# Patient Record
Sex: FEMALE | Race: WHITE | Employment: PART TIME | URBAN - METROPOLITAN AREA
[De-identification: names, ages, dates, MRNs, and addresses within clinical notes are randomized per-mention and may not be internally consistent; named-entity substitution may affect disease eponyms.]

---

## 2020-01-22 ENCOUNTER — HOSPITAL ENCOUNTER (OUTPATIENT)
Age: 50
Setting detail: SPECIMEN
Discharge: HOME | End: 2020-01-22
Payer: COMMERCIAL

## 2020-01-22 LAB
ABSOLUTE EOS #: 0.1 K/UL (ref 0–0.44)
ABSOLUTE IMMATURE GRANULOCYTE: <0.03 K/UL (ref 0–0.3)
ABSOLUTE LYMPH #: 2.35 K/UL (ref 1.1–3.7)
ABSOLUTE MONO #: 0.45 K/UL (ref 0.1–1.2)
ALBUMIN SERPL-MCNC: 4.2 G/DL (ref 3.5–5.2)
ALBUMIN/GLOBULIN RATIO: 1.4 (ref 1–2.5)
ALP BLD-CCNC: 79 U/L (ref 35–104)
ALT SERPL-CCNC: 13 U/L (ref 5–33)
ANION GAP SERPL CALCULATED.3IONS-SCNC: 14 MMOL/L (ref 9–17)
AST SERPL-CCNC: 13 U/L
BASOPHILS # BLD: 1 % (ref 0–2)
BASOPHILS ABSOLUTE: 0.05 K/UL (ref 0–0.2)
BILIRUB SERPL-MCNC: 0.34 MG/DL (ref 0.3–1.2)
BUN BLDV-MCNC: 18 MG/DL (ref 6–20)
BUN/CREAT BLD: NORMAL (ref 9–20)
CALCIUM SERPL-MCNC: 9.5 MG/DL (ref 8.6–10.4)
CHLORIDE BLD-SCNC: 105 MMOL/L (ref 98–107)
CHOLESTEROL/HDL RATIO: 4.3
CHOLESTEROL: 251 MG/DL
CO2: 23 MMOL/L (ref 20–31)
CREAT SERPL-MCNC: 0.74 MG/DL (ref 0.5–0.9)
DIFFERENTIAL TYPE: NORMAL
EOSINOPHILS RELATIVE PERCENT: 2 % (ref 1–4)
FERRITIN: 71 UG/L (ref 13–150)
GFR AFRICAN AMERICAN: >60 ML/MIN
GFR NON-AFRICAN AMERICAN: >60 ML/MIN
GFR SERPL CREATININE-BSD FRML MDRD: NORMAL ML/MIN/{1.73_M2}
GFR SERPL CREATININE-BSD FRML MDRD: NORMAL ML/MIN/{1.73_M2}
GLUCOSE BLD-MCNC: 94 MG/DL (ref 70–99)
HCT VFR BLD CALC: 42 % (ref 36.3–47.1)
HDLC SERPL-MCNC: 59 MG/DL
HEMOGLOBIN: 14 G/DL (ref 11.9–15.1)
IMMATURE GRANULOCYTES: 0 %
IRON SATURATION: 28 % (ref 20–55)
IRON: 100 UG/DL (ref 37–145)
LDL CHOLESTEROL: 172 MG/DL (ref 0–130)
LYMPHOCYTES # BLD: 42 % (ref 24–43)
MCH RBC QN AUTO: 32.5 PG (ref 25.2–33.5)
MCHC RBC AUTO-ENTMCNC: 33.3 G/DL (ref 28.4–34.8)
MCV RBC AUTO: 97.4 FL (ref 82.6–102.9)
MONOCYTES # BLD: 8 % (ref 3–12)
NRBC AUTOMATED: 0 PER 100 WBC
PDW BLD-RTO: 12.6 % (ref 11.8–14.4)
PLATELET # BLD: 252 K/UL (ref 138–453)
PLATELET ESTIMATE: NORMAL
PMV BLD AUTO: 9.8 FL (ref 8.1–13.5)
POTASSIUM SERPL-SCNC: 4.9 MMOL/L (ref 3.7–5.3)
RBC # BLD: 4.31 M/UL (ref 3.95–5.11)
RBC # BLD: NORMAL 10*6/UL
RHEUMATOID FACTOR: <10 IU/ML
SEG NEUTROPHILS: 47 % (ref 36–65)
SEGMENTED NEUTROPHILS ABSOLUTE COUNT: 2.6 K/UL (ref 1.5–8.1)
SODIUM BLD-SCNC: 142 MMOL/L (ref 135–144)
TOTAL IRON BINDING CAPACITY: 355 UG/DL (ref 250–450)
TOTAL PROTEIN: 7.2 G/DL (ref 6.4–8.3)
TRIGL SERPL-MCNC: 100 MG/DL
TSH SERPL DL<=0.05 MIU/L-ACNC: 0.92 MIU/L (ref 0.3–5)
UNSATURATED IRON BINDING CAPACITY: 255 UG/DL (ref 112–347)
VITAMIN D 25-HYDROXY: 29.9 NG/ML (ref 30–100)
VLDLC SERPL CALC-MCNC: ABNORMAL MG/DL (ref 1–30)
WBC # BLD: 5.6 K/UL (ref 3.5–11.3)
WBC # BLD: NORMAL 10*3/UL

## 2020-01-23 LAB
ANTI-NUCLEAR ANTIBODY (ANA): NEGATIVE
SEDIMENTATION RATE, ERYTHROCYTE: 15 MM (ref 0–20)

## 2020-07-30 ENCOUNTER — HOSPITAL ENCOUNTER (OUTPATIENT)
Dept: GENERAL RADIOLOGY | Age: 50
Discharge: HOME | End: 2020-08-01
Payer: COMMERCIAL

## 2020-07-30 ENCOUNTER — OFFICE VISIT (OUTPATIENT)
Dept: NEUROLOGY | Age: 50
End: 2020-07-30
Payer: COMMERCIAL

## 2020-07-30 ENCOUNTER — HOSPITAL ENCOUNTER (OUTPATIENT)
Dept: LAB | Age: 50
Discharge: HOME | End: 2020-07-30
Payer: COMMERCIAL

## 2020-07-30 VITALS
BODY MASS INDEX: 32.37 KG/M2 | SYSTOLIC BLOOD PRESSURE: 154 MMHG | HEART RATE: 92 BPM | DIASTOLIC BLOOD PRESSURE: 88 MMHG | OXYGEN SATURATION: 96 % | TEMPERATURE: 97.9 F | HEIGHT: 66 IN | WEIGHT: 201.4 LBS

## 2020-07-30 PROBLEM — F32.A ANXIETY AND DEPRESSION: Status: ACTIVE | Noted: 2020-07-30

## 2020-07-30 PROBLEM — M54.2 CHRONIC NECK AND BACK PAIN: Status: ACTIVE | Noted: 2020-07-30

## 2020-07-30 PROBLEM — R41.0 CONFUSION: Status: ACTIVE | Noted: 2020-07-30

## 2020-07-30 PROBLEM — R42 DIZZINESS: Status: ACTIVE | Noted: 2020-07-30

## 2020-07-30 PROBLEM — F17.200 SMOKER: Status: ACTIVE | Noted: 2020-07-30

## 2020-07-30 PROBLEM — Z91.81 H/O FALLING: Status: ACTIVE | Noted: 2020-07-30

## 2020-07-30 PROBLEM — F41.9 ANXIETY AND DEPRESSION: Status: ACTIVE | Noted: 2020-07-30

## 2020-07-30 PROBLEM — R29.898 WEAKNESS OF BOTH HANDS: Status: ACTIVE | Noted: 2020-07-30

## 2020-07-30 PROBLEM — R68.89 FORGETFULNESS: Status: ACTIVE | Noted: 2020-07-30

## 2020-07-30 PROBLEM — G62.9 PERIPHERAL POLYNEUROPATHY: Status: ACTIVE | Noted: 2020-07-30

## 2020-07-30 PROBLEM — M19.90 OSTEOARTHRITIS: Status: ACTIVE | Noted: 2020-07-30

## 2020-07-30 PROBLEM — R41.3 MEMORY PROBLEM: Status: ACTIVE | Noted: 2020-07-30

## 2020-07-30 PROBLEM — M54.9 CHRONIC NECK AND BACK PAIN: Status: ACTIVE | Noted: 2020-07-30

## 2020-07-30 PROBLEM — Z81.8 FAMILY HISTORY OF DEMENTIA: Status: ACTIVE | Noted: 2020-07-30

## 2020-07-30 PROBLEM — G89.29 CHRONIC NECK AND BACK PAIN: Status: ACTIVE | Noted: 2020-07-30

## 2020-07-30 PROBLEM — Z87.828 OLD HEAD INJURY: Status: ACTIVE | Noted: 2020-07-30

## 2020-07-30 PROBLEM — R41.9 COGNITIVE COMPLAINTS: Status: ACTIVE | Noted: 2020-07-30

## 2020-07-30 PROBLEM — R26.89 BALANCE PROBLEM: Status: ACTIVE | Noted: 2020-07-30

## 2020-07-30 PROBLEM — R29.898 WEAKNESS OF BOTH LEGS: Status: ACTIVE | Noted: 2020-07-30

## 2020-07-30 LAB
RHEUMATOID FACTOR: <10 IU/ML
TSH SERPL DL<=0.05 MIU/L-ACNC: 1.08 MIU/L (ref 0.3–5)

## 2020-07-30 PROCEDURE — 82607 VITAMIN B-12: CPT

## 2020-07-30 PROCEDURE — 86431 RHEUMATOID FACTOR QUANT: CPT

## 2020-07-30 PROCEDURE — 84443 ASSAY THYROID STIM HORMONE: CPT

## 2020-07-30 PROCEDURE — G8427 DOCREV CUR MEDS BY ELIG CLIN: HCPCS | Performed by: PSYCHIATRY & NEUROLOGY

## 2020-07-30 PROCEDURE — 85613 RUSSELL VIPER VENOM DILUTED: CPT

## 2020-07-30 PROCEDURE — 36415 COLL VENOUS BLD VENIPUNCTURE: CPT

## 2020-07-30 PROCEDURE — 72040 X-RAY EXAM NECK SPINE 2-3 VW: CPT

## 2020-07-30 PROCEDURE — G8926 SPIRO NO PERF OR DOC: HCPCS | Performed by: PSYCHIATRY & NEUROLOGY

## 2020-07-30 PROCEDURE — G8417 CALC BMI ABV UP PARAM F/U: HCPCS | Performed by: PSYCHIATRY & NEUROLOGY

## 2020-07-30 PROCEDURE — 86038 ANTINUCLEAR ANTIBODIES: CPT

## 2020-07-30 PROCEDURE — 99214 OFFICE O/P EST MOD 30 MIN: CPT

## 2020-07-30 PROCEDURE — 85610 PROTHROMBIN TIME: CPT

## 2020-07-30 PROCEDURE — 72100 X-RAY EXAM L-S SPINE 2/3 VWS: CPT

## 2020-07-30 PROCEDURE — 99245 OFF/OP CONSLTJ NEW/EST HI 55: CPT | Performed by: PSYCHIATRY & NEUROLOGY

## 2020-07-30 PROCEDURE — 85730 THROMBOPLASTIN TIME PARTIAL: CPT

## 2020-07-30 PROCEDURE — 71046 X-RAY EXAM CHEST 2 VIEWS: CPT

## 2020-07-30 PROCEDURE — 3023F SPIROM DOC REV: CPT | Performed by: PSYCHIATRY & NEUROLOGY

## 2020-07-30 PROCEDURE — 86147 CARDIOLIPIN ANTIBODY EA IG: CPT

## 2020-07-30 PROCEDURE — 82746 ASSAY OF FOLIC ACID SERUM: CPT

## 2020-07-30 ASSESSMENT — ENCOUNTER SYMPTOMS
APNEA: 0
CONSTIPATION: 0
FACIAL SWELLING: 0
DIARRHEA: 0
SHORTNESS OF BREATH: 0
BLOOD IN STOOL: 0
EYE REDNESS: 0
ABDOMINAL PAIN: 0
EYE DISCHARGE: 0
CHEST TIGHTNESS: 0
EYE ITCHING: 0
TROUBLE SWALLOWING: 0
VISUAL CHANGE: 0
NAUSEA: 0
BACK PAIN: 1
VOICE CHANGE: 0
PHOTOPHOBIA: 0
WHEEZING: 0
COUGH: 0
CHOKING: 0
SORE THROAT: 0
BOWEL INCONTINENCE: 0
COLOR CHANGE: 0
SINUS PRESSURE: 0
VOMITING: 0
ABDOMINAL DISTENTION: 0
EYE PAIN: 0

## 2020-07-30 NOTE — PATIENT INSTRUCTIONS
[facility] NEUROLOGY    Due to the complex nature of our neurological testing, It is the policy of the Neurology Department not to release the results of your testing over the phone. Once all testing is completed and we have all the results back, Dr. Brinda Caraballo will then personally go over all the results with you and answer any questions that you may have during a follow up appointment. If you are unable to keep this appointment, please notify the [address] @ [phone], as soon as possible. Please bring your prescription bottles to all appointments. * FALL   PRECAUTIONS. *   ADEQUATE   FLUID  INTAKE   AND  ELECTROLYTE  BALANCE           * KEEP  DAIRY  OF   THE  NEUROLOGICAL  SYMPTOMS          *  TO  MAINTAIN  REGULAR  SLEEP  WAKE  CYCLES. *   TO  HAVE  ADEQUATE  REST  AND   SLEEP    HOURS.        *    AVOID  USAGE OF   TOBACCO,  EXCESSIVE  ALCOHOL                AND   ILLEGAL   SUBSTANCES,  IF  ANY          *  Maintain   Healthy  Life Style    With   Periodic  Monitoring  Of      Any  Medical  Conditions  Including   Elevated  Blood  Pressure,  Lipid  Profile,     Blood  Sugar levels  And   Heart  Disease. *   Period   Screening  For  Cancers  Involving  Breast,  Colon,   lungs  And  Other  Organs  As  Applicable,  In consultation   With  Your  Primary Care Providers. *  If   There is  Any  Significant  Worsening   Of  Current  Symptoms  And  Or  If    Any additional  New  Neurological  Symptoms                 Or  Significant  Concerns   Should  Call  911 or  Go  To  Emergency  Department  For  Further  Immediate  Evaluation.

## 2020-07-30 NOTE — PROGRESS NOTES
[facility]  Neurology  [address] [address]  TATNS:[phone]   Fax: [phone]    SUBJECTIVE:     PATIENT ID:  Adelfo Odom is a  RIGHT    HANDED 47 y.o. female. Neurologic Problem   The patient's primary symptoms include clumsiness, focal sensory loss, focal weakness, a loss of balance, memory loss and weakness. The patient's pertinent negatives include no altered mental status, near-syncope, slurred speech, syncope or visual change. This is a chronic problem. Episode onset: WORSE  SINCE      2018   The neurological problem developed insidiously. The problem has been waxing and waning since onset. There was right-sided, upper extremity, lower extremity and left-sided focality noted. Associated symptoms include back pain, confusion, dizziness and light-headedness. Pertinent negatives include no abdominal pain, auditory change, aura, bladder incontinence, bowel incontinence, chest pain, diaphoresis, fatigue, fever, headaches, nausea, neck pain, palpitations, shortness of breath, vertigo or vomiting. Past treatments include nothing. The treatment provided no relief. Her past medical history is significant for dementia, head trauma and mood changes. There is no history of a bleeding disorder, a clotting disorder, a CVA, liver disease or seizures. History obtained from  The patient     and other  available   medical  records   were  Also  reviewed. The  Duration,  Quality,  Severity,  Location,  Timing,  Context,  Modifying  Factors   Of   The   Chief   Complaint   And  Present  Illness       Was   Reviewed   In   Chronological   Manner.                                             PATIENT'S  MAIN  CONCERNS INCLUDE :                     1)       H/O   PROGRESSIVE  MEMORY  PROBLEMS,                                  FORGETFULNESS,       WORD  FINDING     AND                                        BRIEF   CONFUSION        FOR MORE  THAN    ONE     YEAR                              2)      FAMILY     H/O     DEMENTIA                                               -   MOTHER                          3)      H/O   CHRONIC   ANXIETY,   DEPRESSION                                                 FOR     MORE  THAN   30    YEARS                            -    ON    WELLBUTRIN                                 TO  FOLLOW  WITH  MENTAL  HEALTH  PROFESSIONALS                                4)      MULTIPLE  CO  MORBID  MEDICAL  CONDITIONS                                     -   BEING  FOLLOWED  BY     HER  PCP                              5)                   H/O     CHRONIC  SMOKING                    PATIENT  AWARE  OF  RISKS  AND                 SIDE  EFFECTS  OF   SMOKING   DISCUSSED. PATIENT   ADVISED   AND  COUNSELED    TO   QUIT  SMOKING.                        6)     H/O   CHRONIC    ARTHRITIS                             -   ON    NEURONTIN,    FLEXERIL ,     MOBIC                       7)      PREVIOUS     H/O    CONCUSSIONS                                           DUE  TO    FALLING                             8)        PREVIOUS      H/O   ALCOHOL    ABUSE                      9)         H/O   CHRONIC   INTERMITTENT   DIZZINESS                              HAD  CARDIOLOGY   EVALUATIONS    IN     THE PAST                     10)      H/O   RECURRENT  FALLING                      11)      H/O   MILD    BALANCE PROBLEMS                                         FOR  MORE  THAN  ONE    YEAR                           12)     H/O   CHRONIC  NECK   AND  BACK PAIN                                 PREVIOUS     H/O    NECK   AND  LUMBAR    SURGERIES                               13)     PERIPHERAL  POLYNEUROPATHY     IN                              GLOVE  AND  STOCKING  MANNER                                     14)       IN  VIEW  OF  THE  PATIENT'S    MULTIPLE   NEUROLOGICAL                           SYMPTOMS  AND CONCERNS    FOR  PROLONGED   DURATION,                           AND    MULTIPLE   CO MORBID  MEDICAL  CONDITIONS,                           PATIENT    NEEDS  NEURO  DIAGNOSTIC  EVALUATIONS                      FOR   ANY   NEUROLOGICAL  PATHOLOGIES    AND  OTHER                        CORRECTABLE   ETIOLOGIES;     AND                              PATIENT  REQUESTS   THE  SAME. 15)      VARIOUS  RISK   FACTORS   WERE  REVIEWED   AND   DISCUSSED. PATIENT   HAS  MULTIPLE   MEDICAL, NEUROLOGICAL                                AND   MENTAL HEALTH   PROBLEMS                                     PATIENT'S   MANAGEMENT  IS  CHALLENGING.                                                             PRECIPITATING  FACTORS: including  fever/infection, exertion/relaxation, position change, stress, weather change,                        medications/alcohol, time of day/darkness/light  Are  absent                                                              MODIFYING  FACTORS:  fever/infection, exertion/relaxation, position change, stress, weather change,                        medications/alcohol, time of day/darkness/light  Are  absent             Patient   Indicates   The  Presence   And  The  Absence  Of  The  Following    Associated  And   Additional  Neurological    Symptoms:                                Balance  And coordination   problems  present           Gait problems     present            Headaches      absent              Migraines           absent           Memory problemspresent              Confusion        present            Paresthesia   numbness          absent           Seizures  And  Starring  Episodes           absent           Syncope,  Near  syncopal episodes         absent           Speech   problems           absent             Swallowing   Problems      absent            Dizziness,  Light headedness           present              Vertigo absent             Generalized   Weakness    present              focal  Weakness     absent             Tremors         absent              Sleep  Problems     present             History  Of   Recent  Head  Injury     absent             History  Of   Recent  TIA     absent             History  Of   Recent    Stroke     absent             Neck  Pain   and   Neck muscle  Spasms  present               Radiating  down   And   Weakness           absent            Lower back   Pain  And     Spasms  absent              Radiating    Down   And   Weakness          present                H/OFALLS         Present                 History  Of   Visual  Symptoms    absent                  Associated   Diplopia       absent                                               Also   Additional   Symptoms   Present    As  Documented    In   The   detailed                  Review  Of  Systems   And    Please   Refer   To    Them for   Additional    Information. Any components  That are either  Unobtainable  Or  Limited  In   HPI, ROS  And/or PFSH   Are                   Due   ToPatient's  Medical  Problems,  Clinical  Condition   and/or lack of                                other    Alternate   resources.           RECORDS   REVIEWED:    historical medical records       INFORMATION   REVIEWED:     MEDICAL   HISTORY,SURGICAL   HISTORY,     MEDICATIONS   LIST,   ALLERGIES AND  DRUG  INTOLERANCES,       FAMILY   HISTORY,  SOCIAL  HISTORY,      PROBLEM  LIST   FOR  PATIENT  CARE   COORDINATION      Past Medical History:   Diagnosis Date    Arthritis     COPD [facility]     Depression     Hyperlipidemia     Hypertension          Past Surgical History:   Procedure Laterality Date    BACK SURGERY      TUBAL LIGATION           Current Outpatient Medications   Medication Sig Dispense Refill    loratadine (CLARITIN) 10 MG tablet Take 10 mg by mouth daily      amLODIPine (NORVASC) 10 MG tablet Take 10 mg by mouth daily      atorvastatin (LIPITOR) 40 MG tablet Take 40 mg by mouth daily      buPROPion (WELLBUTRIN XL) 150 MG extended release tablet Take 150 mg by mouth every morning      cyclobenzaprine (FLEXERIL) 10 MG tablet Take 10 mg by mouth 3 times daily as needed for Muscle spasms      gabapentin (NEURONTIN) 300 MG capsule Take 300 mg by mouth 3 times daily.  budesonide-formoterol (SYMBICORT) 160-4.5 MCG/ACT AERO Inhale 2 puffs into the lungs 2 times daily      pantoprazole (PROTONIX) 40 MG tablet Take 40 mg by mouth daily      albuterol (PROVENTIL) (2.5 MG/3ML) 0.083% nebulizer solution Take 2.5 mg by nebulization every 6 hours as needed for Wheezing      meloxicam (MOBIC) 15 MG tablet Take 15 mg by mouth daily      albuterol sulfate HFA (VENTOLIN HFA) 108 (90 Base) MCG/ACT inhaler Inhale 2 puffs into the lungs every 6 hours as needed for Wheezing      SODIUM FLUORIDE, DENTAL GEL, 1.1 % CREA Place onto teeth      nicotine polacrilex (COMMIT) 4 MG lozenge Take 4 mg by mouth as needed for Smoking cessation      nicotine (NICODERM CQ) 21 MG/24HR Place 1 patch onto the skin every 24 hours      lisinopril (PRINIVIL;ZESTRIL) 10 MG tablet Take 10 mg by mouth daily      fexofenadine (ALLEGRA) 60 MG tablet Take 60 mg by mouth daily       No current facility-administered medications for this visit. Allergies   Allergen Reactions    Methyldopa Anaphylaxis         History reviewed. No pertinent family history.       Social History     Socioeconomic History    Marital status: Single     Spouse name: Not on file    Number of children: Not on file    Years of education: Not on file    Highest education level: Not on file   Occupational History    Not on file   Social Needs    Financial resource strain: Not on file    Food insecurity     Worry: Not on file     Inability: Not on file    Transportation needs     Medical: Not on file     Non-medical: Not on file   Tobacco Use  Smoking status: Current Every Day Smoker    Smokeless tobacco: Never Used   Substance and Sexual Activity    Alcohol use: Not on file    Drug use: Not on file    Sexual activity: Not on file   Lifestyle    Physical activity     Days per week: Not on file     Minutes per session: Not on file    Stress: Not on file   Relationships    Social connections     Talks on phone: Not on file     Gets together: Not on file     Attends Orthodox service: Not on file     Active member of club or organization: Not on file     Attends meetings of clubs or organizations: Not on file     Relationship status: Not on file    Intimate partner violence     Fear of current or ex partner: Not on file     Emotionally abused: Not on file     Physically abused: Not on file     Forced sexual activity: Not on file   Other Topics Concern    Not on file   Social History Narrative    Not on file       Vitals:    07/30/20 1055   BP: (!) 154/88   Pulse: 92   Temp: 97.9 °F (36.6 °C)   SpO2: 96%         Wt Readings from Last 3 Encounters:   07/30/20 201 lb 6.4 oz (91.4 kg)         BP Readings from Last 3 Encounters:   07/30/20 (!) 154/88       Hematology and Coagulation  Lab Results   Component Value Date    WBC 5.6 01/22/2020    RBC 4.31 01/22/2020    HGB 14.0 01/22/2020    HCT 42.0 01/22/2020    MCV 97.4 01/22/2020    MCH 32.5 01/22/2020    MCHC 33.3 01/22/2020    RDW 12.6 01/22/2020     01/22/2020    MPV 9.8 01/22/2020         Chemistries  Lab Results   Component Value Date     01/22/2020    K 4.9 01/22/2020     01/22/2020    CO2 23 01/22/2020    BUN 18 01/22/2020    CREATININE 0.74 01/22/2020    CALCIUM 9.5 01/22/2020    PROT 7.2 01/22/2020    LABALBU 4.2 01/22/2020    BILITOT 0.34 01/22/2020    ALKPHOS 79 01/22/2020    AST 13 01/22/2020    ALT 13 01/22/2020     Lab Results   Component Value Date    ALKPHOS 79 01/22/2020    ALT 13 01/22/2020    AST 13 01/22/2020    PROT 7.2 01/22/2020    BILITOT 0.34 01/22/2020 LABALBU 4.2 01/22/2020     Lab Results   Component Value Date    BUN 18 01/22/2020    CREATININE 0.74 01/22/2020     Lab Results   Component Value Date    CALCIUM 9.5 01/22/2020     Lab Results   Component Value Date    AST 13 01/22/2020    ALT 13 01/22/2020       Review of Systems   Constitutional: Negative for appetite change, chills, diaphoresis, fatigue, fever and unexpected weight change. HENT: Negative for congestion, dental problem, drooling, ear discharge, ear pain, facial swelling, hearing loss, mouth sores, nosebleeds, postnasal drip, sinus pressure, sore throat, tinnitus, trouble swallowing and voice change. Eyes: Negative for photophobia, pain, discharge, redness, itching and visual disturbance. Respiratory: Negative for apnea, cough, choking, chest tightness, shortness of breath and wheezing. Cardiovascular: Negative for chest pain, palpitations, leg swelling and near-syncope. Gastrointestinal: Negative for abdominal distention, abdominal pain, blood in stool, bowel incontinence, constipation, diarrhea, nausea and vomiting. Endocrine: Negative for cold intolerance, heat intolerance, polydipsia, polyphagia and polyuria. Genitourinary: Negative for bladder incontinence. Musculoskeletal: Positive for arthralgias, back pain and gait problem. Negative for joint swelling, myalgias, neck pain and neck stiffness. Skin: Negative for color change, pallor, rash and wound. Allergic/Immunologic: Negative for environmental allergies, food allergies and immunocompromised state. Neurological: Positive for dizziness, focal weakness, weakness, light-headedness, numbness and loss of balance. Negative for vertigo, tremors, seizures, syncope, facial asymmetry, speech difficulty and headaches. Hematological: Negative for adenopathy. Does not bruise/bleed easily. Psychiatric/Behavioral: Positive for confusion, decreased concentration, memory loss and sleep disturbance.  Negative for agitation, behavioral problems, dysphoric mood, hallucinations, self-injury and suicidal ideas. The patient is nervous/anxious. The patient is not hyperactive. OBJECTIVE:    Physical Exam  Constitutional:       Appearance: She is well-developed. HENT:      Head: Normocephalic and atraumatic. No raccoon eyes or Donaldson's sign. Right Ear: External ear normal.      Left Ear: External ear normal.      Nose: Nose normal.   Eyes:      Conjunctiva/sclera: Conjunctivae normal.      Pupils: Pupils are equal, round, and reactive to light. Neck:      Musculoskeletal: Normal range of motion and neck supple. Normal range of motion. No neck rigidity or muscular tenderness. Thyroid: No thyroid mass or thyromegaly. Vascular: No carotid bruit. Trachea: No tracheal deviation. Meningeal: Brudzinski's sign and Kernig's sign absent. Cardiovascular:      Rate and Rhythm: Normal rate and regular rhythm. Pulmonary:      Effort: Pulmonary effort is normal.   Musculoskeletal: Normal range of motion. General: No tenderness. Skin:     General: Skin is warm. Coloration: Skin is not pale. Findings: No erythema or rash. Nails: There is no clubbing. Psychiatric:         Attention and Perception: She is attentive. Mood and Affect: Mood is anxious and depressed. Affect is not labile, blunt or inappropriate. Speech: She is communicative. Speech is not rapid and pressured, delayed, slurred or tangential.         Behavior: Behavior is slowed. Behavior is not agitated, aggressive, withdrawn, hyperactive or combative. Behavior is cooperative. Thought Content: Thought content is not paranoid or delusional. Thought content does not include homicidal or suicidal ideation. Thought content does not include homicidal or suicidal plan. Cognition and Memory: Cognition is impaired. Memory is not impaired. She exhibits impaired recent memory.  She does not exhibit impaired Abnormal  Movements noted           D) SENSORY :               Light   touch, pinprick,   position  And  Vibration   DECREASED                               IN  GLOVE  AND  STOCKING   MANNER      E) REFLEXES:                   Deep  Tendon  Reflexes    DECREASED                  No  pathological  Reflexes  Bilaterally. F) COORDINATION  AND  GAIT :                                Station and  Gait  normal                              Romberg 's test     POSITIVE                            Ataxia negative          ASSESSMENT:        Patient Active Problem List   Diagnosis    [facility]    Hyperlipidemia    Hypertension    Arthritis    Depression    Smoker    Dizziness    Memory problem    Confusion    Forgetfulness    Balance problem    Peripheral polyneuropathy    Chronic neck and back pain    H/O falling    Weakness of both hands    Weakness of both legs    Anxiety and depression    Family history of dementia    Osteoarthritis    Old head injury    Cognitive complaints         VISITING DIAGNOSIS:          ICD-10-CM    1. Memory problem  R41.3    2. Chronic obstructive pulmonary disease, unspecified COPD type ([facility]  J44.9 TSH without Reflex     Vitamin B12 & Folate     BROCK Screen with Reflex     Rheumatoid Factor     Lupus Anticoagulant     EEG     EMG     EMG     MRI BRAIN WO CONTRAST     VL DUP CAROTID BILATERAL     US TRANSCRANIAL DOPPLER COMPLETE     XR CHEST STANDARD (2 VW)     XR CERVICAL SPINE (4-5 VIEWS)     XR LUMBAR SPINE (2-3 VIEWS)   3.  Hyperlipidemia, unspecified hyperlipidemia type  E78.5 TSH without Reflex     Vitamin B12 & Folate     BROCK Screen with Reflex     Rheumatoid Factor     Lupus Anticoagulant     EEG     EMG     EMG     MRI BRAIN WO CONTRAST     VL DUP CAROTID BILATERAL     US TRANSCRANIAL DOPPLER COMPLETE     XR CHEST STANDARD (2 VW)     XR CERVICAL SPINE (4-5 VIEWS)     XR LUMBAR SPINE (2-3 VIEWS) 4. Essential hypertension  I10 TSH without Reflex     Vitamin B12 & Folate     BROCK Screen with Reflex     Rheumatoid Factor     Lupus Anticoagulant     EEG     EMG     EMG     MRI BRAIN WO CONTRAST     VL DUP CAROTID BILATERAL     US TRANSCRANIAL DOPPLER COMPLETE     XR CHEST STANDARD (2 VW)     XR CERVICAL SPINE (4-5 VIEWS)     XR LUMBAR SPINE (2-3 VIEWS)   5. Arthritis  M19.90 TSH without Reflex     Vitamin B12 & Folate     BROCK Screen with Reflex     Rheumatoid Factor     Lupus Anticoagulant     EEG     EMG     EMG     MRI BRAIN WO CONTRAST     VL DUP CAROTID BILATERAL     US TRANSCRANIAL DOPPLER COMPLETE     XR CHEST STANDARD (2 VW)     XR CERVICAL SPINE (4-5 VIEWS)     XR LUMBAR SPINE (2-3 VIEWS)   6. Depression, unspecified depression type  F32.9 TSH without Reflex     Vitamin B12 & Folate     BROCK Screen with Reflex     Rheumatoid Factor     Lupus Anticoagulant     EEG     EMG     EMG     MRI BRAIN WO CONTRAST     VL DUP CAROTID BILATERAL     US TRANSCRANIAL DOPPLER COMPLETE     XR CHEST STANDARD (2 VW)     XR CERVICAL SPINE (4-5 VIEWS)     XR LUMBAR SPINE (2-3 VIEWS)   7. Smoker  F17.200 XR CHEST STANDARD (2 VW)   8. Dizziness  R42 VL DUP CAROTID BILATERAL   9. Confusion  R41.0    10. Forgetfulness  R68.89    11. Balance problem  R26.89    12. Peripheral polyneuropathy  G62.9    13. Chronic neck and back pain  M54.2     M54.9     G89.29    14. H/O falling  Z91.81    15. Weakness of both hands  R29.898    16. Weakness of both legs  R29.898    17. Family history of dementia  Z81.8    25. Anxiety and depression  F41.9     F32.9    19. Osteoarthritis, unspecified osteoarthritis type, unspecified site  M19.90    20. Old head injury  Z87.828    21.  Cognitive complaints  R41.9                   CONCERNS   &   INCREASED   RISK   FOR         * TIA,  CEREBRO  VASCULAR  ISCHEMIA,STROKE     *   DIZZINESS,   VERTEBROBASILAR  INSUFFICIENCY ,        *   COGNITIVE  &   MEMORY PROBLEMS  AND  DEMENTIAS       * SYMPTOMS        RECORDING THE    DURATION  AND  FREQUENCY. *  AVOID    CONDITIONS  AND  FACTORS   THAT  MAKE                  NEUROLOGICAL  SYMPTOMS  WORSE.                       *TO  MAINTAIN  REGULAR  SLEEP  WAKE  CYCLES. *   TO  HAVE  ADEQUATE  REST  AND   SLEEP    HOURS.          *    TO   AVOID   TO  SLEEP  IN   SUPINE  POSITION. *      WEIGHT   LOSS. *    AVOID  ANY USAGE OF    TOBACCO,              EXCESSIVE  ALCOHOL  AND   ILLEGAL   SUBSTANCES              *  CONTINUE   MEDICATIONS    PRESCRIBED       AS    RECOMMENDED       *   Compliance   With  Medications   And  Instructions            *    Antiplatelet  therapy    As   Recommended  Was   Discussed      *    Prophylactic  Use   Of     Vitamin   B   Complex,  Folic  Acid,    Vitamin  B12    Multivitamin,       Calcium  With  magnesium  And  Vit D    Supplementations   Over  The  Counter  Discussed               *FOOT  CARE, DAILY  INSPECTION  OF  FEET   AND         PERIODIC  PODIATRY EVALUATIONS . *  PATIENT  IS  ALSO   COUNSELED   TO  KEEP    ACTIVITIES:         A)   SIMPLE      B)  ORGANIZED      C)  WRITEDOWN                     *  EVALUATIONS  AND  FOLLOW UP:                 * PHYSICAL  THERAPY                                *CARDIOLOGY                    *  ORTHO                                                              *   IN  VIEW  OF  THE  PATIENT'S    MULTIPLE   NEUROLOGICAL                           SYMPTOMS  AND  CONCERNS    FOR  PROLONGED   DURATION,                           AND    MULTIPLE   CO MORBID  MEDICAL  CONDITIONS,                           PATIENT    NEEDS  NEURO  DIAGNOSTIC  EVALUATIONS                      FOR   ANY   NEUROLOGICAL  PATHOLOGIES    AND  OTHER                        CORRECTABLE   ETIOLOGIES;     AND                              PATIENT  REQUESTS   THE  SAME.           Orders Placed This Encounter   Procedures    MRI BRAIN WO CONTRAST    VL DUP CAROTID BILATERAL    US TRANSCRANIAL DOPPLER COMPLETE    XR CHEST STANDARD (2 VW)    XR CERVICAL SPINE (4-5 VIEWS)    XR LUMBAR SPINE (2-3 VIEWS)    TSH without Reflex    Vitamin B12 & Folate    BROCK Screen with Reflex    Rheumatoid Factor    Lupus Anticoagulant    EEG    EMG    EMG                         *  PATIENT  TO  RETURN  THE  CLINIC  AFTER   ABOVE,                       FOR   FURTHER    RE EVALUATIONS                               AND  ADDITIONAL  RECOMMENDATIONS ,                        INCLUDING  MEDICAL  MANAGEMENT,                        AS   CLINICALLY    INDICATED. *PATIENT   TO  FOLLOW  UP  WITH   PRIMARY  CARE         OTHER  CONSULTANTS  AS  BEFORE.           *TO  FOLLOW  WITH   MENTAL  HEALTH  PROFESSIONALS ,  INCLUDING            PSYCHOLOGICAL  COUNSELING   AND  PSYCHIATRIC  EVALUTIONS,                   *  Maintain   Healthy  Life Style    With   Periodic  Monitoring  Of      Any  Medical  Conditions  Including   Elevated  Blood  Pressure,  Lipid  Profile,     Blood  Sugar levels  AndHeart  Disease. *   Period   Screening  For  Cancers  Involving  Breast,  Colon,    lungs  And  Other  Organs  As  Applicable,  In consultation   With  Your  Primary Care Providers. *Second  Neurological  Opinion  And  Evaluations  In  [facility]  Setting  If  Patient  Is  Interested. * Please   Contact   Neurology  Clinic   Early   If   Are  Any  New  Neurological   And  Any neurological  Concerns. *  If  The  Patient remains  Neurologically  Stable   Return   To  [facility] Neurology Department   IN      1-2       MONTHS  TIME   FOR  FURTHER              FOLLOW UP.                       *   The  Neurological   Findings,  Possible  Diagnosis,  Differential diagnoses                    And  Options  For    Further   Investigations                   And  management   Are  Discussed  Comprehensively.                     Medications And  Prescription   Risks  And  Side effects  Are   Also  Discussed. *  If   There is  Any  Significant  Worsening   Of  Current  Symptoms  And  Or                  If patient  Develops   Any additional  New  NeurologicalSymptoms                  Or  Significant  Concerns   Should  Call  911 or  Go  To  Emergency  Department  For  Further  Immediate  Evaluation. The   Above  Were  Reviewed  With  patient   and                       questions  Answered  In  Detail. More   Than  50% of face  To face Time   Was  Spent  On  Counseling                    And   Coordination  Of  Care   Of   Patient's  multiple   Neurological  Problems                         And   Comorbid  Medical   Conditions. Electronically signed by Yessenia Chamberlain MD    Board Certified in  Neurology &  In  [facility] ([facility]      DISCLAIMER:   Although every effort was made to ensure the accuracy of this  electronictranscription, some errors in transcription may have occurred. GENERAL PATIENT INSTRUCTIONS:      A Healthy Lifestyle: Care Instructions   Your Care Instructions   A healthy lifestyle can help you feel good, stay at ahealthy weight, and have plenty of energy for both work and play. A healthy lifestyle is something you can share with your whole family.  A healthy lifestyle also can lower your risk for serious health problems, such ashigh blood pressure, heart disease, and diabetes.  You can follow a few steps listed below to improve your health and the health of your family.  Follow-up careis a key part of your treatment and safety. Be sure to make and go to all appointments, and call your doctor if you are having problems. Its also a good idea to know your test results and keep a list of the medicines you take.  How can you care for yourself at home?    Do not eat too much sugar, fat, or fast foods. You can still have dessert and treats nowand then. The goal is moderation.  Start small to improve your eating habits. Pay attention to portion sizes, drink less juice and soda pop, and eat more fruits and vegetables.  Eat a healthy amount of food. A 3-ounce serving of meat, for example, is about the size of a deck of cards. Fill the rest of your plate with vegetables and whole grains.  Limit theamount of soda and sports drinks you have every day. Drink more water when you are thirsty.  Eat at least 5 servings of fruits and vegetables every day. It may seem like a lot, but it is not hard to reach this goal. Aserving or helping is 1 piece of fruit, 1 cup of vegetables, or 2 cups of leafy, raw vegetables. Have an apple or some carrot sticks as an afternoon snack instead of a candy bar. Try to have fruits and/or vegetables at everymeal.   Make exercise part of your daily routine. You may want to start with simple activities, such as walking, bicycling, or slow swimming. Try pete active 30 to 60 minutes every day. You do not need to do all 30 to 60 minutes all at once. For example, you can exercise 3 times a day for 10 or 20 minutes. Moderate exercise is safe for most people, but it is always agood idea to talk to your doctor before starting an exercise program.   Keep moving. Matthew Horns the lawn, work in the garden, or [employer]. Take the stairs instead of the elevator at work.  If you smoke, quit. Peoplewho smoke have an increased risk for heart attack, stroke, cancer, and other lung illnesses. Quitting is hard, but there are ways to boost your chance of quitting tobacco for good.  Use nicotine gum, patches, or lozenges.  Ask your doctor about stop-smoking programs and medicines.  Keep trying.    In addition to reducing your risk of diseases in the future, you will notice some benefits soon after you stop using tobacco. If you have shortness of breath or asthma symptoms, they will likely getbetter within a few weeks after you quit.  Limit how much alcohol you drink. Moderate amounts of alcohol (up to 2 drinks a day for men, 1drink a day for women) are okay. But drinking too much can lead to liver problems, high blood pressure, and other health problems.  health   If you have a family, there are many things you can do together to improve your health.  Eat meals together as a family as often as possible.  Eat healthy foods. This includes fruits, vegetables, lean meats and dairy, and whole grains.  Include your family in your fitness plan. Most peoplethink of activities such as jogging or tennis as the way to fitness, but there are many ways you and your family can be more active. Anything that makes you breathe hard and gets your heart pumping is exercise. Here are sometips:   Walk to do errands or to take your child to school or the bus.  Go for a family bike ride after dinner instead of watching TV.  Where can you learn more?  Go toCobases://[employer]. org and sign in to your [employer] account. Enter G612 in the Search HealthInformation box to learn more about \"A Healthy Lifestyle: Care Instructions. \"     If you do not have anaccount, please click on the \"Sign Up Now\" link.  Current as of: July 26, 2016   Content Version: 11.2   © 5665-8764 [employer]. Care instructions adapted under license by [facility] ([facility]). If you have questions about a medical condition or this instruction, always ask your healthcare professional. [employer] disclaims any warranty or liability for your use of this information.

## 2020-07-31 LAB
ANTI-NUCLEAR ANTIBODY (ANA): NEGATIVE
FOLATE: 12.5 NG/ML
VITAMIN B-12: 465 PG/ML (ref 232–1245)

## 2020-08-04 LAB
ANTICARDIOLIPIN IGA ANTIBODY: 0.4 APU
ANTICARDIOLIPIN IGG ANTIBODY: 1.1 GPU
CARDIOLIPIN AB IGM: 2.1 MPU
DILUTE RUSSELL VIPER VENOM TIME: NORMAL
INR BLD: 0.9
LUPUS ANTICOAG: NORMAL
PARTIAL THROMBOPLASTIN TIME: 24.7 SEC (ref 20.5–30.5)
PROTHROMBIN TIME: 9.6 SEC (ref 9–12)

## 2020-08-12 ENCOUNTER — HOSPITAL ENCOUNTER (OUTPATIENT)
Dept: INTERVENTIONAL RADIOLOGY/VASCULAR | Age: 50
Discharge: HOME | End: 2020-08-14
Payer: COMMERCIAL

## 2020-08-12 ENCOUNTER — HOSPITAL ENCOUNTER (OUTPATIENT)
Dept: MRI IMAGING | Age: 50
Discharge: HOME | End: 2020-08-14
Payer: COMMERCIAL

## 2020-08-12 PROCEDURE — 70551 MRI BRAIN STEM W/O DYE: CPT

## 2020-08-12 PROCEDURE — 93880 EXTRACRANIAL BILAT STUDY: CPT

## 2020-08-18 ENCOUNTER — HOSPITAL ENCOUNTER (OUTPATIENT)
Dept: NEUROLOGY | Age: 50
Discharge: HOME | End: 2020-08-18
Payer: COMMERCIAL

## 2020-08-18 PROCEDURE — 95886 MUSC TEST DONE W/N TEST COMP: CPT

## 2020-08-18 PROCEDURE — 95912 NRV CNDJ TEST 11-12 STUDIES: CPT

## 2020-08-18 NOTE — PROGRESS NOTES
EMG/NCS Bilateral    uppers Completed    PCP: Alvin Grimaldo, APRN - CNP    Ordering: Trang Garcia. Luz Neurologist    Interpreting Physician: Trang Garcia.  Luz Neurologist    Technician: Tanvir Thomas RRT

## 2020-08-25 ENCOUNTER — HOSPITAL ENCOUNTER (OUTPATIENT)
Dept: NEUROLOGY | Age: 50
Discharge: HOME | End: 2020-08-25
Payer: COMMERCIAL

## 2020-08-25 PROCEDURE — 95886 MUSC TEST DONE W/N TEST COMP: CPT

## 2020-08-25 PROCEDURE — 95911 NRV CNDJ TEST 9-10 STUDIES: CPT

## 2020-08-25 NOTE — PROGRESS NOTES
EMG/NCS Bilateral    lower Completed    PCP: Steven Clarke, APRN - CNP    Ordering: Joel Perez. Luz Neurologist    Interpreting Physician: Joel Perez.  Ronnie Jacobs, Neurologist    Technician: Jose L Burkett RN

## 2020-08-26 NOTE — PROCEDURES
[facility] [address]                 [address] [place] [id number]                        EMG/NERVE CONDUCTION STUDIES REPORT      PATIENT NAME: Candy Heimlich                  :        1970  MED REC NO:   [id number]                             ROOM:  ACCOUNT NO:   [de-identified]                           ADMIT DATE: 2020  PROVIDER:     Omar Yadav MD    DATE OF EM2020    TECHNICAL SUMMARY:  The nature, purpose, goals, expectations and process  involved in the procedures of nerve conduction studies and needle  electromyography were reviewed, discussed, explained and verbal consent  was obtained from the patient. The patient's questions were answered  with reference to the above processes and procedures. There were no significant technical difficulties encountered during this  study and nerve conduction studies were performed under temperature  monitoring. CLINICAL DATA:        The patient is a 79-year-old female with history of  numbness, tingling, and paresthesias involving both feet and legs. The  patient also has history of weakness involving both lower extremities,  cramping of her legs. The patient has gait difficulty, balance problem,  tripping, falling. The patient has previous history of lower lumbar  disk surgery 4 to 5 years ago. The purpose of the study is to evaluate for mononeuropathy,  radiculopathy, plexopathy, peripheral polyneuropathy, myopathy. SUMMARY:        The sensory nerve action potentials of the right and left  sural and superficial peroneal nerves were not recordable bilaterally. Compound muscle action potentials of the right and left peroneal nerve  show low amplitude with borderline distal latencies and normal  conduction velocities.     Compound muscle action potentials of the right and left tibial nerve  show normal amplitude on the right side, low amplitude on the left side  with prolonged distal latencies and low conduction velocities  bilaterally. Proximal conductions as measured by the F responses were normal in both  peroneal nerves and nonrecordable in both tibial nerves. Tibial H responses were not recordable. Nerve  Conductions   Results  Were  Personally  Reviewed and  Analysed. Abnormal  Nerve  Conductions  Were  Personally  Repeated,  Verified, reconfirmed  And  Updated as needed  appropriately. Please    See   Wave  Forms   And    Details  Of     Nerve  Conduction   Studies   For  Additional  Information             Extensive   Needle  Electromyography  Was personally  Performed  In  Both      Lower  Extremities      In  The  Following  Muscles :        Gluteus  Medius,   Vastus  Lateralis,  Internal  Hamstring,  External  Hamstring,     Anterior Tibialis,  Medial  Gastrocnemius,            Extensive  Needle  Electromyography  Shows  No   Abnormality with :       A) Normal  insertional  Activity. There  Is  Absence  Of   P  Waves,  Fibrillations,  Fasciculations and        Other  Spontaneous   Activity. B) Voluntary  Motor unit  Potentials    Show :    Normal  Effort,  Recruitment, amplitude,  Duration. No Polyphasia  Noted. IMPRESSION:        1. There is electrodiagnostic evidence of moderate sensorimotor    peripheral polyneuropathy involving examined both lower extremities. 2.  There is also electrodiagnostic evidence of associated peroneal    mononeuropathies bilaterally. 3.  The nonrecordable tibial F and H responses are suggestive of L5-S1    Involvement. 4.  Needle electromyography shows no active denervation changes or    chronic reinnervation changes. 5.  There is no definite electrodiagnostic evidence of inflammatory    myopathy involving examined both lower extremities. Further clinical correlation, followup recommended.         Donnie Ramos MD  Board Certified Neurologist    D: 08/25/2020 10:01:54       T: 08/25/2020 10:22:33     [email or web address]  Job#: [id number]     Doc#: [id number]

## 2020-09-18 ENCOUNTER — TELEPHONE (OUTPATIENT)
Dept: PULMONOLOGY | Age: 50
End: 2020-09-18

## 2020-09-18 NOTE — TELEPHONE ENCOUNTER
Referring physician office called to schedule patient for Pulmonary consultation. Writer scheduled appointment, notified need for pft and chest xray. Also notified office patient will need covid testing prior to pft and we will call to schedule. Writer phoned [employer] at surgery center and notified her of need for COVID testing prior to 10/13/2020 PFT.

## 2020-09-23 ENCOUNTER — PRE-PROCEDURE TELEPHONE (OUTPATIENT)
Dept: PREADMISSION TESTING | Age: 50
End: 2020-09-23

## 2020-10-07 ENCOUNTER — HOSPITAL ENCOUNTER (OUTPATIENT)
Dept: NEUROLOGY | Age: 50
Discharge: HOME | End: 2020-10-07
Payer: COMMERCIAL

## 2020-10-07 PROCEDURE — 93892 TCD EMBOLI DETECT W/O INJ: CPT

## 2020-10-07 PROCEDURE — 93886 INTRACRANIAL COMPLETE STUDY: CPT

## 2020-10-07 PROCEDURE — 95957 EEG DIGITAL ANALYSIS: CPT

## 2020-10-07 PROCEDURE — 95813 EEG EXTND MNTR 61-119 MIN: CPT

## 2020-10-07 NOTE — PROGRESS NOTES
TCD Completed with Emboli Detection. PCP: KEIRY Levy - BAUDILIO    Ordering: Ruby Aleman. Luz Neurologist    Interpreting Physician: Ruby Aleman.  Carlitos Escobar    Electronically signed by Rosario Barreto RN on 10/7/2020 at 11:05 AM

## 2020-10-07 NOTE — PROGRESS NOTES
Extended EEG completed with spike analysis.     PCP:Cheri Clark CNP    Ordering: Irma Hernandez, Neurologist    Interpreting physician: Irma Hernandez, Neurologist    Technician: Suresh Deleon RRT

## 2020-10-08 ENCOUNTER — HOSPITAL ENCOUNTER (OUTPATIENT)
Dept: PREADMISSION TESTING | Age: 50
Setting detail: SPECIMEN
Discharge: HOME | End: 2020-10-12
Payer: COMMERCIAL

## 2020-10-08 ENCOUNTER — OFFICE VISIT (OUTPATIENT)
Dept: NEUROLOGY | Age: 50
End: 2020-10-08
Payer: COMMERCIAL

## 2020-10-08 VITALS
SYSTOLIC BLOOD PRESSURE: 138 MMHG | OXYGEN SATURATION: 95 % | HEIGHT: 66 IN | TEMPERATURE: 96.8 F | HEART RATE: 78 BPM | WEIGHT: 206.2 LBS | RESPIRATION RATE: 16 BRPM | BODY MASS INDEX: 33.14 KG/M2 | DIASTOLIC BLOOD PRESSURE: 80 MMHG

## 2020-10-08 PROBLEM — R94.01 ABNORMAL EEG: Status: ACTIVE | Noted: 2020-10-08

## 2020-10-08 PROBLEM — G40.209 CRYPTOGENIC PARTIAL COMPLEX EPILEPSY (HCC): Status: ACTIVE | Noted: 2020-10-08

## 2020-10-08 PROBLEM — R41.3 MEMORY LOSS: Status: ACTIVE | Noted: 2020-10-08

## 2020-10-08 PROBLEM — I67.82 CHRONIC CEREBRAL ISCHEMIA: Status: ACTIVE | Noted: 2020-10-08

## 2020-10-08 PROBLEM — G47.9 SLEEP DIFFICULTIES: Status: ACTIVE | Noted: 2020-10-08

## 2020-10-08 PROCEDURE — 99214 OFFICE O/P EST MOD 30 MIN: CPT

## 2020-10-08 PROCEDURE — 3023F SPIROM DOC REV: CPT | Performed by: PSYCHIATRY & NEUROLOGY

## 2020-10-08 PROCEDURE — G8427 DOCREV CUR MEDS BY ELIG CLIN: HCPCS | Performed by: PSYCHIATRY & NEUROLOGY

## 2020-10-08 PROCEDURE — 99215 OFFICE O/P EST HI 40 MIN: CPT | Performed by: PSYCHIATRY & NEUROLOGY

## 2020-10-08 PROCEDURE — G8926 SPIRO NO PERF OR DOC: HCPCS | Performed by: PSYCHIATRY & NEUROLOGY

## 2020-10-08 PROCEDURE — G8484 FLU IMMUNIZE NO ADMIN: HCPCS | Performed by: PSYCHIATRY & NEUROLOGY

## 2020-10-08 PROCEDURE — U0003 INFECTIOUS AGENT DETECTION BY NUCLEIC ACID (DNA OR RNA); SEVERE ACUTE RESPIRATORY SYNDROME CORONAVIRUS 2 (SARS-COV-2) (CORONAVIRUS DISEASE [COVID-19]), AMPLIFIED PROBE TECHNIQUE, MAKING USE OF HIGH THROUGHPUT TECHNOLOGIES AS DESCRIBED BY CMS-2020-01-R: HCPCS

## 2020-10-08 PROCEDURE — G8417 CALC BMI ABV UP PARAM F/U: HCPCS | Performed by: PSYCHIATRY & NEUROLOGY

## 2020-10-08 PROCEDURE — 3017F COLORECTAL CA SCREEN DOC REV: CPT | Performed by: PSYCHIATRY & NEUROLOGY

## 2020-10-08 PROCEDURE — 4004F PT TOBACCO SCREEN RCVD TLK: CPT | Performed by: PSYCHIATRY & NEUROLOGY

## 2020-10-08 RX ORDER — FLUOXETINE HYDROCHLORIDE 40 MG/1
20 CAPSULE ORAL DAILY
Qty: 30 CAPSULE | Refills: 1 | Status: SHIPPED | OUTPATIENT
Start: 2020-10-08 | End: 2020-12-08

## 2020-10-08 RX ORDER — OXCARBAZEPINE 150 MG/1
TABLET, FILM COATED ORAL
Qty: 120 TABLET | Refills: 1 | Status: SHIPPED | OUTPATIENT
Start: 2020-10-08 | End: 2021-07-21

## 2020-10-08 RX ORDER — TRAZODONE HYDROCHLORIDE 100 MG/1
100 TABLET ORAL NIGHTLY
Qty: 30 TABLET | Refills: 2 | Status: SHIPPED | OUTPATIENT
Start: 2020-10-08 | End: 2020-12-09 | Stop reason: SDUPTHER

## 2020-10-08 ASSESSMENT — ENCOUNTER SYMPTOMS
VOICE CHANGE: 0
EYE PAIN: 0
APNEA: 0
EYE ITCHING: 0
BLOOD IN STOOL: 0
NAUSEA: 0
COLOR CHANGE: 0
COUGH: 0
VISUAL CHANGE: 0
VOMITING: 0
CONSTIPATION: 0
SORE THROAT: 0
TROUBLE SWALLOWING: 0
ABDOMINAL PAIN: 0
CHOKING: 0
BOWEL INCONTINENCE: 0
SHORTNESS OF BREATH: 0
SINUS PRESSURE: 0
CHEST TIGHTNESS: 0
DIARRHEA: 0
BACK PAIN: 1
FACIAL SWELLING: 0
EYE REDNESS: 0
WHEEZING: 0
EYE DISCHARGE: 0
PHOTOPHOBIA: 0
ABDOMINAL DISTENTION: 0

## 2020-10-08 NOTE — PROGRESS NOTES
[facility]  Neurology  [address] [place] [address]  UTDAL:[phone]   Fax: [phone]    SUBJECTIVE:     PATIENT ID:  Todd Hernandez is a  RIGHT    HANDED 45 y.o. female. Neurologic Problem   The patient's primary symptoms include clumsiness, focal sensory loss, focal weakness, a loss of balance, memory loss and weakness. The patient's pertinent negatives include no altered mental status, near-syncope, slurred speech, syncope or visual change. This is a chronic problem. Episode onset: WORSE  SINCE      2018   The neurological problem developed insidiously. The problem has been waxing and waning since onset. There was right-sided, upper extremity, lower extremity and left-sided focality noted. Associated symptoms include back pain, confusion, dizziness and light-headedness. Pertinent negatives include no abdominal pain, auditory change, aura, bladder incontinence, bowel incontinence, chest pain, diaphoresis, fatigue, fever, headaches, nausea, neck pain, palpitations, shortness of breath, vertigo or vomiting. Past treatments include nothing. The treatment provided no relief. Her past medical history is significant for dementia, head trauma and mood changes. There is no history of a bleeding disorder, a clotting disorder, a CVA, liver disease or seizures. History obtained from  The patient     and other  available   medical  records   were  Also  reviewed. The  Duration,  Quality,  Severity,  Location,  Timing,  Context,  Modifying  Factors   Of   The   Chief   Complaint       And  Present  Illness  Was   Reviewed   In   Chronological   Manner.                                             PATIENT'S  MAIN  CONCERNS INCLUDE :                       1)       H/O   PROGRESSIVE  MEMORY  PROBLEMS,                                  FORGETFULNESS,       WORD  FINDING     AND                                        BRIEF   CONFUSION        FOR MORE  THAN    ONE     YEAR                              2)      FAMILY     H/O     DEMENTIA                                               -   MOTHER                          3)      H/O   CHRONIC   ANXIETY,   DEPRESSION                                                 FOR     MORE  THAN   30    YEARS                            -    ON    WELLBUTRIN                                 TO  FOLLOW  WITH  MENTAL  HEALTH  PROFESSIONALS                                4)      MULTIPLE  CO  MORBID  MEDICAL  CONDITIONS                                     -   BEING  FOLLOWED  BY     HER  PCP                              5)                   H/O     CHRONIC  SMOKING                    PATIENT  AWARE  OF  RISKS  AND                 SIDE  EFFECTS  OF   SMOKING   DISCUSSED. PATIENT   ADVISED   AND  COUNSELED    TO   QUIT  SMOKING.                        6)     H/O   CHRONIC    ARTHRITIS                             -   ON    NEURONTIN,    FLEXERIL ,     MOBIC                       7)      PREVIOUS     H/O    CONCUSSIONS                                           DUE  TO    FALLING                             8)        PREVIOUS      H/O   ALCOHOL    ABUSE                      9)         H/O   CHRONIC   INTERMITTENT   DIZZINESS                              HAD  CARDIOLOGY   EVALUATIONS    IN     THE PAST                     10)      H/O   RECURRENT  FALLING                        11)      H/O   MILD    BALANCE PROBLEMS                                         FOR  MORE  THAN  ONE    YEAR                             12)     H/O   CHRONIC  NECK   AND  BACK PAIN                                 PREVIOUS     H/O    NECK   AND  LUMBAR    SURGERIES                               13)     PERIPHERAL  POLYNEUROPATHY     IN                              GLOVE  AND  STOCKING  MANNER                                     14)   H/O     EPISODES  OF  MEMORY  LAPSES                                     AND  BRIEF  CONFUSION      LASTING 1 -2   MINUTES                                     INTERMITTENTLY      SINCE    2019                                D /  D     INCLUDE                                      NON  CONVULSIVE  SEIZURES                                          PARTIAL  COMPLEX  SEIZURES                                         NON  EPILEPTIC   EVENTS                                             15)            PATIENT   HAD    NEURO  DIAGNOSTIC  EVALUATIONS                           A)     MRI  BRAIN     IN   AUG. 2020                                          SHOWED  NO   ACUTE   PATHOLOGY                                           CHRONIC  CEREBRAL  ISCHEMIA                               B)          LABS     ARE  WITHIN  NORMAL  LIMITS. C)       EEG    IN OCT. 2020     ABNORMAL                           16)            AS   DISCUSSED    WITH  PATIENT                               PATIENT  WILL   BE  TRIED  WITH    ANTI  EPILEPTIC  MEDICATIONS                                         TRILEPTAL                                             EXPECTATIONS,   GOALS   AND  SIDE  EFFECTS  MEDICATION    WERE                                 REVIEWED     AND   DISCUSSED    IN    DETAIL.                              17)      PATIENT      HAS  BEEN  ON  WELLBUTRIN       FROM    HER PCP                                 FOR  MORE  THAN  ONE  YEAR,       WHICH                                CAN   INCREASE   THE  RISK  FOR    SEIZURES                                  PATIENT   ADVISED   TO                                   A)     DISCONTINUE   WELLBUTRIN                                     B)    TRY   ALTERNATE    ANTI  DEPRESSANT  MEDICATIONS                                            -  PROZAC  AND   TRAZODONE                                        C)     TO  FOLLOW  WITH  MENTAL HEALTH PROFESSIONALS                                                                18)      VARIOUS  RISK   FACTORS   WERE  REVIEWED   AND DISCUSSED. PATIENT   HAS  MULTIPLE   MEDICAL, NEUROLOGICAL                                AND   MENTAL HEALTH   PROBLEMS                                     PATIENT'S   MANAGEMENT  IS  CHALLENGING.                                                             PRECIPITATING  FACTORS: including  fever/infection, exertion/relaxation, position change, stress, weather change,                        medications/alcohol, time of day/darkness/light  Are  absent                                                              MODIFYING  FACTORS:  fever/infection, exertion/relaxation, position change, stress, weather change,                        medications/alcohol, time of day/darkness/light  Are  absent             Patient   Indicates   The  Presence   And  The  Absence  Of  The  Following    Associated  And   Additional  Neurological    Symptoms:                                Balance  And coordination   problems  present           Gait problems     present            Headaches      absent              Migraines           absent           Memory problemspresent              Confusion        present            Paresthesia   numbness          absent           Seizures  And  Starring  Episodes           absent           Syncope,  Near  syncopal episodes         absent           Speech   problems           absent             Swallowing   Problems      absent            Dizziness,  Light headedness           present              Vertigo        absent             Generalized   Weakness    present              focal  Weakness     absent             Tremors         absent              Sleep  Problems     present             History  Of   Recent  Head  Injury     absent             History  Of   Recent  TIA     absent             History  Of   Recent    Stroke     absent             Neck  Pain   and   Neck muscle  Spasms  present               Radiating  down   And   Weakness           absent            Lower back   Pain  And     Spasms  absent              Radiating    Down   And   Weakness          present                H/OFALLS         Present                 History  Of   Visual  Symptoms    absent                  Associated   Diplopia       absent                                               Also   Additional   Symptoms   Present    As  Documented    In   The   detailed                  Review  Of  Systems   And    Please   Refer   To    Them for   Additional    Information. Any components  That are either  Unobtainable  Or  Limited  In   HPI, ROS  And/or PFSH   Are                   Due   ToPatient's  Medical  Problems,  Clinical  Condition   and/or lack of                                other    Alternate   resources.           RECORDS   REVIEWED:    historical medical records       INFORMATION   REVIEWED:     MEDICAL   HISTORY,SURGICAL   HISTORY,     MEDICATIONS   LIST,   ALLERGIES AND  DRUG  INTOLERANCES,       FAMILY   HISTORY,  SOCIAL  HISTORY,      PROBLEM  LIST   FOR  PATIENT  CARE   COORDINATION      Past Medical History:   Diagnosis Date    Arthritis     COPD [facility]chronic obstructive [facility] disease) ([facility] [address]     Depression     Hyperlipidemia     Hypertension          Past Surgical History:   Procedure Laterality Date    BACK SURGERY      TUBAL LIGATION           Current Outpatient Medications   Medication Sig Dispense Refill    OXcarbazepine (TRILEPTAL) 150 MG tablet WK 1 : take none in am - take 1 in pm  WK 2 : take 1 in am - take 1 in pm  WK 3 : take 1 in am - take 2 in pm  WK 4 : take 2 in am - take 2 in pm 120 tablet 1    FLUoxetine (PROZAC) 40 MG capsule Take 1 capsule by mouth daily 30 capsule 1    traZODone (DESYREL) 100 MG tablet Take 1 tablet by mouth nightly ONE  TABLET   AT  BEDTIME 30 tablet 2    loratadine (CLARITIN) 10 MG tablet Take 10 mg by mouth daily      amLODIPine (NORVASC) 10 MG tablet Take 10 mg by mouth daily      atorvastatin (LIPITOR) 40 MG tablet Take 40 mg by mouth daily      cyclobenzaprine (FLEXERIL) 10 MG tablet Take 10 mg by mouth 3 times daily as needed for Muscle spasms      gabapentin (NEURONTIN) 300 MG capsule Take 300 mg by mouth 3 times daily.  budesonide-formoterol (SYMBICORT) 160-4.5 MCG/ACT AERO Inhale 2 puffs into the lungs 2 times daily      lisinopril (PRINIVIL;ZESTRIL) 10 MG tablet Take 10 mg by mouth daily      pantoprazole (PROTONIX) 40 MG tablet Take 40 mg by mouth daily      albuterol (PROVENTIL) (2.5 MG/3ML) 0.083% nebulizer solution Take 2.5 mg by nebulization every 6 hours as needed for Wheezing      meloxicam (MOBIC) 15 MG tablet Take 15 mg by mouth daily      albuterol sulfate HFA (VENTOLIN HFA) 108 (90 Base) MCG/ACT inhaler Inhale 2 puffs into the lungs every 6 hours as needed for Wheezing      SODIUM FLUORIDE, DENTAL GEL, 1.1 % CREA Place onto teeth      nicotine polacrilex (COMMIT) 4 MG lozenge Take 4 mg by mouth as needed for Smoking cessation      nicotine (NICODERM CQ) 21 MG/24HR Place 1 patch onto the skin every 24 hours      fexofenadine (ALLEGRA) 60 MG tablet Take 60 mg by mouth daily       No current facility-administered medications for this visit. Allergies   Allergen Reactions    Methyldopa Anaphylaxis         History reviewed. No pertinent family history.       Social History     Socioeconomic History    Marital status: Single     Spouse name: Not on file    Number of children: Not on file    Years of education: Not on file    Highest education level: Not on file   Occupational History    Not on file   Social Needs    Financial resource strain: Not on file    Food insecurity     Worry: Not on file     Inability: Not on file    Transportation needs     Medical: Not on file     Non-medical: Not on file   Tobacco Use    Smoking status: Current Every Day Smoker    Smokeless tobacco: Never Used   Substance and Sexual Activity    Alcohol use: Not on file    Drug use: Not on file    Sexual activity: Not on file   Lifestyle    Physical activity     Days per week: Not on file     Minutes per session: Not on file    Stress: Not on file   Relationships    Social connections     Talks on phone: Not on file     Gets together: Not on file     Attends Jewish service: Not on file     Active member of club or organization: Not on file     Attends meetings of clubs or organizations: Not on file     Relationship status: Not on file    Intimate partner violence     Fear of current or ex partner: Not on file     Emotionally abused: Not on file     Physically abused: Not on file     Forced sexual activity: Not on file   Other Topics Concern    Not on file   Social History Narrative    Not on file       Vitals:    10/08/20 1056   BP: 138/80   Pulse: 78   Resp: 16   Temp: 96.8 °F (36 °C)   SpO2: 95%         Wt Readings from Last 3 Encounters:   10/08/20 206 lb 3.2 oz (93.5 kg)   07/30/20 201 lb 6.4 oz (91.4 kg)         BP Readings from Last 3 Encounters:   10/08/20 138/80   07/30/20 (!) 154/88       Hematology and Coagulation  Lab Results   Component Value Date    WBC 5.6 01/22/2020    RBC 4.31 01/22/2020    HGB 14.0 01/22/2020    HCT 42.0 01/22/2020    MCV 97.4 01/22/2020    MCH 32.5 01/22/2020    MCHC 33.3 01/22/2020    RDW 12.6 01/22/2020     01/22/2020    MPV 9.8 01/22/2020         Chemistries  Lab Results   Component Value Date     01/22/2020    K 4.9 01/22/2020     01/22/2020    CO2 23 01/22/2020    BUN 18 01/22/2020    CREATININE 0.74 01/22/2020    CALCIUM 9.5 01/22/2020    PROT 7.2 01/22/2020    LABALBU 4.2 01/22/2020    BILITOT 0.34 01/22/2020    ALKPHOS 79 01/22/2020    AST 13 01/22/2020    ALT 13 01/22/2020     Lab Results   Component Value Date    ALKPHOS 79 01/22/2020    ALT 13 01/22/2020    AST 13 01/22/2020    PROT 7.2 01/22/2020    BILITOT 0.34 01/22/2020    LABALBU 4.2 01/22/2020     Lab Results   Component Value Date    BUN 18 01/22/2020    CREATININE 0.74 01/22/2020 Lab Results   Component Value Date    CALCIUM 9.5 01/22/2020     Lab Results   Component Value Date    AST 13 01/22/2020    ALT 13 01/22/2020       Review of Systems   Constitutional: Negative for appetite change, chills, diaphoresis, fatigue, fever and unexpected weight change. HENT: Negative for congestion, dental problem, drooling, ear discharge, ear pain, facial swelling, hearing loss, mouth sores, nosebleeds, postnasal drip, sinus pressure, sore throat, tinnitus, trouble swallowing and voice change. Eyes: Negative for photophobia, pain, discharge, redness, itching and visual disturbance. Respiratory: Negative for apnea, cough, choking, chest tightness, shortness of breath and wheezing. Cardiovascular: Negative for chest pain, palpitations, leg swelling and near-syncope. Gastrointestinal: Negative for abdominal distention, abdominal pain, blood in stool, bowel incontinence, constipation, diarrhea, nausea and vomiting. Endocrine: Negative for cold intolerance, heat intolerance, polydipsia, polyphagia and polyuria. Genitourinary: Negative for bladder incontinence. Musculoskeletal: Positive for arthralgias, back pain and gait problem. Negative for joint swelling, myalgias, neck pain and neck stiffness. Skin: Negative for color change, pallor, rash and wound. Allergic/Immunologic: Negative for environmental allergies, food allergies and immunocompromised state. Neurological: Positive for dizziness, focal weakness, weakness, light-headedness, numbness and loss of balance. Negative for vertigo, tremors, seizures, syncope, facial asymmetry, speech difficulty and headaches. Hematological: Negative for adenopathy. Does not bruise/bleed easily. Psychiatric/Behavioral: Positive for confusion, decreased concentration, memory loss and sleep disturbance. Negative for agitation, behavioral problems, dysphoric mood, hallucinations, self-injury and suicidal ideas. The patient is nervous/anxious. The patient is not hyperactive. OBJECTIVE:    Physical Exam  Constitutional:       Appearance: She is well-developed. HENT:      Head: Normocephalic and atraumatic. No raccoon eyes or Donaldson's sign. Right Ear: External ear normal.      Left Ear: External ear normal.      Nose: Nose normal.   Eyes:      Conjunctiva/sclera: Conjunctivae normal.      Pupils: Pupils are equal, round, and reactive to light. Neck:      Musculoskeletal: Normal range of motion and neck supple. Normal range of motion. No neck rigidity or muscular tenderness. Thyroid: No thyroid mass or thyromegaly. Vascular: No carotid bruit. Trachea: No tracheal deviation. Meningeal: Brudzinski's sign and Kernig's sign absent. Cardiovascular:      Rate and Rhythm: Normal rate and regular rhythm. Pulmonary:      Effort: Pulmonary effort is normal.   Musculoskeletal: Normal range of motion. General: No tenderness. Skin:     General: Skin is warm. Coloration: Skin is not pale. Findings: No erythema or rash. Nails: There is no clubbing. Psychiatric:         Attention and Perception: She is attentive. Mood and Affect: Mood is anxious and depressed. Affect is not labile, blunt or inappropriate. Speech: She is communicative. Speech is not rapid and pressured, delayed, slurred or tangential.         Behavior: Behavior is slowed. Behavior is not agitated, aggressive, withdrawn, hyperactive or combative. Behavior is cooperative. Thought Content: Thought content is not paranoid or delusional. Thought content does not include homicidal or suicidal ideation. Thought content does not include homicidal or suicidal plan. Cognition and Memory: Cognition is impaired. Memory is not impaired. She exhibits impaired recent memory. She does not exhibit impaired remote memory. Judgment: Judgment is not impulsive or inappropriate.            NEUROLOGICALEXAMINATION : A) MENTAL STATUS:                   Alert and  oriented  To time, place  And  Person. No Aphasia. No  Dysarthria. Able   To  Follow     SIMPLE    commands   without   Any  Difficulty. No right  To left confusion. Normal  Speech  And language function. Insight and  Judgment ,Fund  Of  Knowledge   within normal limits                Recent  And  Remote memory    DECREASED                Attention &  Concentration are    DECREASED                     B) CRANIAL NERVES :      CN : Visual  Acuity  And  Visual fields  within normal limits               Fundi  Could  Not  Be  Could  Not  Be  Evaluated. 3,4,6 CN : Both  Pupils are   Reactive and  Equal.  Movements  Are  Intact. No  Nystagmus. No  MODESTO. No  Afferent  Pupillary  Defect noted. 5 CN :  Normal  Facial sensations and Corneal  Reflexes           7 CN:  Normal  Facial  Symmetry  And  Strength. No facial  Weakness. 8 CN :  Hearing  Appears within normal limits          9, 10 CN: Normal   spontaneous, reflex   palate   movements         11 CN:   Normal  Shoulder  shrug and  strength         12 CN :   Normal  Tongue movements and  Tongue  In midline                        No tongue   Fasciculations or atrophy       C) MOTOR  EXAM:                 Strength  In upper  And  Lower   extremities     DECREASED    4 + /5                 No  Drift. No  Atrophy               Rapid   alternating  And  repetitions  Movements   DECREASED               Muscle  Tone  In upper  And  Lower  Extremities  normal                No rigidity. No  Spasticity. Bradykinesia   absent               No  Asterixis.               Sustention  Tremor , Resting   Tremor   absent                    No   other  Abnormal  Movements noted           D) SENSORY :               Light   touch, pinprick,   position  And  Vibration DECREASED                               IN  GLOVE  AND  STOCKING   MANNER      E) REFLEXES:                   Deep  Tendon  Reflexes    DECREASED                  No  pathological  Reflexes  Bilaterally. F) COORDINATION  AND  GAIT :                                Station and  Gait  normal                              Romberg 's test     POSITIVE                            Ataxia negative          ASSESSMENT:        Patient Active Problem List   Diagnosis    COPD [facility]chronic obstructive pulmonary disease) ([facility] [address]    Hyperlipidemia    Hypertension    Arthritis    Depression    Smoker    Dizziness    Memory problem    Confusion    Forgetfulness    Balance problem    Peripheral polyneuropathy    Chronic neck and back pain    H/O falling    Weakness of both hands    Weakness of both legs    Anxiety and depression    Family history of dementia    Osteoarthritis    Old head injury    Cognitive complaints    Chronic cerebral ischemia    Abnormal EEG    Memory loss    Sleep difficulties    Cryptogenic partial complex epilepsy ([facility] [address]           MRI OF THE BRAIN WITHOUT CONTRAST  8/12/2020 8:31 am         TECHNIQUE:    Multiplanar multisequence MRI of the brain was performed without the    administration of intravenous contrast.         COMPARISON:    None.         HISTORY:    ORDERING SYSTEM PROVIDED HISTORY: Chronic obstructive pulmonary disease,    unspecified COPD type ([facility] [address]    TECHNOLOGIST PROVIDED HISTORY:    Is the patient pregnant?->No    Reason for Exam: Patient has had cognitive and memory issues. She has almost    passed out a few times. Symptoms for one year. Acuity: Chronic    Type of Exam: Initial    Additional signs and symptoms: Essential hypertension; Depression,    unspecified depression type.         FINDINGS:    INTRACRANIAL STRUCTURES/VENTRICLES: There is no acute infarct. No mass effect    or midline shift.  No evidence of an acute intracranial demonstrates the systolic velocities of 92,    87, 100 cm/sec in the proximal, mid and distal segments respectively.         The external carotid artery is patent.  The vertebral artery demonstrates    normal antegrade flow.         Mild soft plaque is noted at the origin of the left internal carotid artery    with estimated stenosis of approximately 21%.         ICA/CCA ratio of 1.0.              Impression    The right internal carotid artery demonstrates 0-50% stenosis .         The left internal carotid artery demonstrates 0-50% stenosis .         Bilateral vertebral arteries are patent with flow in the normal direction.               VISITING DIAGNOSIS:          ICD-10-CM    1. Confusion  R41.0    2. Smoker  F17.200    3. Balance problem  R26.89    4. Anxiety and depression  F41.9 External Referral To Psychiatry    F32.9 External Referral To Psychology   5. Cognitive complaints  R41.9    6. Peripheral polyneuropathy  G62.9    7. Essential hypertension  I10    8. Dizziness  R42    9. Chronic neck and back pain  M54.2     M54.9     G89.29    10. Family history of dementia  Z81.8    6. Weakness of both legs  R29.898    12. Memory problem  R41.3    13. H/O falling  Z91.81    14. Post-traumatic osteoarthritis of multiple joints  M15.3    15. Chronic obstructive pulmonary disease, unspecified COPD type ([facility] [address]  J44.9    16. Current mild episode of major depressive disorder, unspecified whether recurrent ([address]  F32.0    17. Forgetfulness  R68.89    18. Weakness of both hands  R29.898    19. Old head injury  Z87.828    20. Mixed hyperlipidemia  E78.2    21. Chronic cerebral ischemia  I67.82    22. Abnormal EEG  R94.01    23. Memory loss  R41.3    24. Cryptogenic partial complex epilepsy ([address]  G40.209    25.  Sleep difficulties  G47.9                   CONCERNS   &   INCREASED   RISK   FOR         * TIA,  CEREBRO  VASCULAR  ISCHEMIA,STROKE     *   DIZZINESS,   VERTEBROBASILAR  INSUFFICIENCY ,        *   COGNITIVE  & MEMORY PROBLEMS  AND  DEMENTIAS       *  MONONEUROPATHIES,   RADICULOPATHY  AND  PLEXOPATHY      *   PERIPHERAL  NEUROPATHY,   NEUROPATHIC  PAIN       *   BALANCE PROBLMES   AND  FALL                VARIOUS  RISK   FACTORS   WERE  REVIEWED   AND   DISCUSSED. *  PATIENT   HAS  MULTIPLE   MEDICAL, NEUROLOGICAL                        AND   MENTAL HEALTH   PROBLEMS         PATIENT'S   MANAGEMENT  IS  CHALLENGING. PLAN:                         Manuel Solis  Of  The  Diagnoses,  The  Management & Treatment  Options            AND    Care  plan  Were          Reviewed and   Discussed   With  patient. * Goals  And  Expectations  Of  The  Therapy  Discussed   And  Reviewed. *   Benefits   And   Side  Effect  Profile  Of  Medication/s   Were   Discussed                * Need   For  Further   Follow up For  The  Various  Problems Were  discussed. * Results  Of  The  Previous  Diagnostic tests were reviewed and  discussed                 Medical  Decision  Making  Was  Made  Based on the   Complexity  Of  Above  Mentioned  Diagnoses,    Data reviewed             And    Risk  Of  Significant   Co morbidities and   complicating   Factors. Medical  Decision  Was   High    Complexity   Due   To  The  Patient's  Multiple  Symptoms,      Advancing   Disease,  Complex  Treatment  Regimen,  Multiple medications           and   Risk  Of   Side  Effects,  Difficulty  In  Medication  Management  And  Diagnostic  Challenges       In  View  Of  The  Associated   Co  Morbid  Conditions   And  Problems. * FALL   PRECAUTIONS. THESE  REVIEWED   AND  DISCUSSED        *   BE  CAREFUL  WITH  ACTIVITIES   INCLUDING  DRIVING.         *   AVOID   NECK  AND/ BACK  STRAINING  ACTIVITIES          *   TO   WEAR   BILATERAL   WRIST  BRACES       *   TO  AVOID  PRESSURE  OVER   ULNAR  ASPECT   OF   ELBOWS        *   ADEQUATE   FLUIDINTAKE   AND  ELECTROLYTE  BALANCE * KEEP  DAIRY  OF   THE  NEUROLOGICAL  SYMPTOMS        RECORDING THE    DURATION  AND  FREQUENCY. *  AVOID    CONDITIONS  AND  FACTORS   THAT  MAKE                  NEUROLOGICAL  SYMPTOMS  WORSE.                       *TO  MAINTAIN  REGULAR  SLEEP  WAKE  CYCLES. *   TO  HAVE  ADEQUATE  REST  AND   SLEEP    HOURS.          *    TO   AVOID   TO  SLEEP  IN   SUPINE  POSITION. *      WEIGHT   LOSS. *    AVOID  ANY USAGE OF    TOBACCO,              EXCESSIVE  ALCOHOL  AND   ILLEGAL   SUBSTANCES              *  CONTINUE   MEDICATIONS    PRESCRIBED       AS    RECOMMENDED       *   Compliance   With  Medications   And  Instructions            *    Antiplatelet  therapy    As   Recommended  Was   Discussed      *    Prophylactic  Use   Of     Vitamin   B   Complex,  Folic  Acid,    Vitamin  B12    Multivitamin,       Calcium  With  magnesium  And  Vit D    Supplementations   Over  The  Counter  Discussed               *FOOT  CARE, DAILY  INSPECTION  OF  FEET   AND         PERIODIC  PODIATRY EVALUATIONS .           *  PATIENT  IS  ALSO   COUNSELED   TO  KEEP    ACTIVITIES:         A)   SIMPLE      B)  ORGANIZED      C)  WRITEDOWN                     *  EVALUATIONS  AND  FOLLOW UP:                 * PHYSICAL  THERAPY                                *CARDIOLOGY                    *  ORTHO            *    PULMONARY                              *      H/O     EPISODES  OF  MEMORY  LAPSES                                     AND  BRIEF  CONFUSION      LASTING    1 -2   MINUTES                                     INTERMITTENTLY      SINCE    2019                                D /  D     INCLUDE                                      NON  CONVULSIVE  SEIZURES                                          PARTIAL  COMPLEX  SEIZURES                                         NON  EPILEPTIC   EVENTS                                            *           PATIENT   HAD    NEURO  DIAGNOSTIC  EVALUATIONS A)     MRI  BRAIN     IN   AUG. 2020                                          SHOWED  NO   ACUTE   PATHOLOGY                                           CHRONIC  CEREBRAL  ISCHEMIA                               B)          LABS     ARE  WITHIN  NORMAL  LIMITS. C)       EEG    IN OCT. 2020     ABNORMAL                          *           AS   DISCUSSED    WITH  PATIENT                               PATIENT  WILL   BE  TRIED  WITH    ANTI  EPILEPTIC  MEDICATIONS                                         TRILEPTAL                                             EXPECTATIONS,   GOALS   AND  SIDE  EFFECTS  MEDICATION    WERE                                 REVIEWED     AND   DISCUSSED    IN    DETAIL. *    PATIENT      HAS  BEEN  ON  WELLBUTRIN       FROM    HER PCP                                 FOR  MORE  THAN  ONE  YEAR,       WHICH                                CAN   INCREASE   THE  RISK  FOR    SEIZURES                                  PATIENT   ADVISED   TO                                   A)     DISCONTINUE   WELLBUTRIN                                     B)    TRY   ALTERNATE    ANTI  DEPRESSANT  MEDICATIONS                                            -  PROZAC  AND   TRAZODONE                                        C)     TO  FOLLOW  WITH  [facility]                                                                     VARIOUS  RISK   FACTORS      AND  SEIZURE  PRECAUTIONS                           WERE  REVIEWED   AND   DISCUSSED.                   Orders Placed This Encounter   Procedures    External Referral To Psychiatry    External Referral To Psychology       Orders Placed This Encounter   Medications    OXcarbazepine (TRILEPTAL) 150 MG tablet     Sig: WK 1 : take none in am - take 1 in pm  WK 2 : take 1 in am - take 1 in pm  WK 3 : take 1 in am - take 2 in pm  WK 4 : take 2 in am - take 2 in pm     Dispense:  120 tablet example, is about the size of a deck of cards. Fill the rest of your plate with vegetables and whole grains.  Limit theamount of soda and sports drinks you have every day. Drink more water when you are thirsty.  Eat at least 5 servings of fruits and vegetables every day. It may seem like a lot, but it is not hard to reach this goal. Aserving or helping is 1 piece of fruit, 1 cup of vegetables, or 2 cups of leafy, raw vegetables. Have an apple or some carrot sticks as an afternoon snack instead of a candy bar. Try to have fruits and/or vegetables at everymeal.   Make exercise part of your daily routine. You may want to start with simple activities, such as walking, bicycling, or slow swimming. Try pete active 30 to 60 minutes every day. You do not need to do all 30 to 60 minutes all at once. For example, you can exercise 3 times a day for 10 or 20 minutes. Moderate exercise is safe for most people, but it is always agood idea to talk to your doctor before starting an exercise program.   Keep moving. Arvil Paddy the lawn, work in the garden, or Nervogrid. Take the stairs instead of the elevator at work.  If you smoke, quit. Peoplewho smoke have an increased risk for heart attack, stroke, cancer, and other lung illnesses. Quitting is hard, but there are ways to boost your chance of quitting tobacco for good.  Use nicotine gum, patches, or lozenges.  Ask your doctor about stop-smoking programs and medicines.  Keep trying.  In addition to reducing your risk of diseases in the future, you will notice some benefits soon after you stop using tobacco. If you have shortness of breath or asthma symptoms, they will likely getbetter within a few weeks after you quit.  Limit how much alcohol you drink. Moderate amounts of alcohol (up to 2 drinks a day for men, 1drink a day for women) are okay. But drinking too much can lead to liver problems, high blood pressure, and other health problems.    health   If you

## 2020-10-08 NOTE — PROCEDURES
PROCEDURES:    HYPERVENTILATION:  Could not be performed due to the patient's clinical  condition. PHOTIC STIMULATION:  Photic stimulation was performed at the following  frequencies: 1 Hz, 3 Hz, 6 Hz, 9 Hz, 12 Hz, 15 Hz, 18 Hz, 21 Hz, 25 Hz,  30 Hz and patient tolerated well. Photic stimulation:  Mild bilateral symmetric driving response noted. SLEEP:  Stage I and stage II. Intermittent isolated and generalized slow, slow sharp waves noted  diffusely. EKG:  EKG showed normal sinus rhythm without any significant  abnormality. IMPRESSION:        There is mild-to-moderate irritable diffuse cerebral    dysfunction noted. Further clinical correlation and followup recommended. Aster Daniel MD  Board Certified Neurologist    D: 10/07/2020 17:06:34       T: 10/07/2020 17:32:12     [email or web address]  Job#: [id number]     Doc#: [id number]    CC:   OCTAVIO Gunderson

## 2020-10-08 NOTE — PROCEDURES
[address] [address]                             Transcranial Doppler (TCD)      PATIENT NAME: Aline Whipple                  :        1970  MED REC NO:   [id number]                             ROOM:  ACCOUNT NO:   [de-identified]                           ADMIT DATE: 10/07/2020  PROVIDER:     Lissa Emery MD    DATE OF STUDY:  10/07/2020        TECHNIQUE:  Transcranial Doppler study of intracranial arteries was  performed using [employer] equipment with digital Doppler technology, with  high resolution 250 Kinross M-mode display and Multigate Spectral Windows  and 2 MHz Doppler probes via temporal, suboccipital and orbital  approaches. Transcranial Doppler study of the intracranial arteries was also  performed for emboli detection without intravenous microbubble injection  using continuous soundtrack, M-Mode with Multigate Spectral displays and  soundtrack displays with continuous bilateral monitoring. CLINICAL DATA:        The patient is 45years old with a history of  hypertension, hyperlipidemia, chronic smoking, dizziness, memory  problem, forgetfulness, confusion, balance problem, falling, weakness,  old head injury, cognitive problems. The purpose of the study is to evaluate for stroke, intracranial focal  stenosis, flow abnormalities, vertebrobasilar insufficiency evaluations. SUMMARY:      The mean flow velocities in the right and left anterior,  middle, and posterior cerebral arteries are within normal limits with  normal PI values. Mean flow velocities in the right and left vertebral arteries, basilar  artery are within normal limits with borderline PI values.         TCD OF INTRACRANIAL ARTERIES FOR EMBOLI DETECTION:      Review and analysis of the waveforms and continuous soundtrack systems  during the current monitoring show no evidence of HITS (high intensity  transient signals) and no embolic shower events were detected. IMPRESSION:      1. No significant focal stenosis or flow abnormalities noted in the         anterior circulation. 2.  No significant vertebrobasilar stenosis or insufficiency noted. 3.  TCD of intracranial arteries for emboli detection is negative. Further clinical correlation and followup recommended.           Madelaine Gasca MD  Board Certified Neurologist    D: 10/07/2020 17:08:24       T: 10/07/2020 17:37:30     [email or web address]  Job#: [id number]     Doc#: [id number]    CC:  OCTAVIO Hart

## 2020-10-09 LAB — SARS-COV-2, NAA: NOT DETECTED

## 2020-10-10 ENCOUNTER — TELEPHONE (OUTPATIENT)
Dept: PRIMARY CARE CLINIC | Age: 50
End: 2020-10-10

## 2020-10-13 ENCOUNTER — HOSPITAL ENCOUNTER (OUTPATIENT)
Dept: PULMONOLOGY | Age: 50
Discharge: HOME | End: 2020-10-13
Payer: COMMERCIAL

## 2020-10-13 PROCEDURE — 94060 EVALUATION OF WHEEZING: CPT

## 2020-10-13 PROCEDURE — 6370000000 HC RX 637 (ALT 250 FOR IP): Performed by: INTERNAL MEDICINE

## 2020-10-13 PROCEDURE — 94726 PLETHYSMOGRAPHY LUNG VOLUMES: CPT

## 2020-10-13 PROCEDURE — 94640 AIRWAY INHALATION TREATMENT: CPT

## 2020-10-13 PROCEDURE — 94729 DIFFUSING CAPACITY: CPT

## 2020-10-13 RX ORDER — ALBUTEROL SULFATE 90 UG/1
4 AEROSOL, METERED RESPIRATORY (INHALATION) ONCE
Status: COMPLETED | OUTPATIENT
Start: 2020-10-13 | End: 2020-10-13

## 2020-10-13 RX ADMIN — ALBUTEROL SULFATE 4 PUFF: 90 AEROSOL, METERED RESPIRATORY (INHALATION) at 12:19

## 2020-10-22 NOTE — PROCEDURES
[facility] [address]                 [address] [place] [id number]                               PULMONARY FUNCTION    PATIENT NAME: Aaron Hoang                  :        1970  MED REC NO:   [id number]                             ROOM:  ACCOUNT NO:   [de-identified]                           ADMIT DATE: 10/13/2020  PROVIDER:     Brenda Darden    DATE OF PROCEDURE:  10/13/2020    INTERPRETATION:  The patient's spirometry shows obstructive flow volume  loop of severe nature. FEV1 is 35% predicted. FVC is 50% predicted. There was no positive bronchodilator change. FEV1/FVC ratio is 56%. Lung volumes by body box, total lung capacity is 93% predicted, %  predicted, and diffusion capacity uncorrected 68% predicted, corrected  for alveolar volume 101% predicted. Airway resistance is normal.    FINAL IMPRESSION:  This study shows severe stage III COPD without  positive bronchodilator change. There is air trapping and there is  moderately decreased diffusion capacity, which could be due to  underlying emphysema. Clinical correlation advised. Herman Vera    D: 10/21/2020 21:00:03       T: 10/21/2020 22:06:39     [email or web address]  Job#: [id number]     Doc#: [id number]    CC:   OCTAVIO Resendez

## 2020-10-28 ENCOUNTER — HOSPITAL ENCOUNTER (OUTPATIENT)
Dept: GENERAL RADIOLOGY | Age: 50
Discharge: HOME | End: 2020-10-30
Payer: COMMERCIAL

## 2020-10-28 PROCEDURE — 71046 X-RAY EXAM CHEST 2 VIEWS: CPT

## 2020-11-03 PROBLEM — M19.90 OSTEOARTHRITIS: Status: RESOLVED | Noted: 2020-07-30 | Resolved: 2020-11-03

## 2020-12-09 ENCOUNTER — OFFICE VISIT (OUTPATIENT)
Dept: NEUROLOGY | Age: 50
End: 2020-12-09
Payer: COMMERCIAL

## 2020-12-09 VITALS
DIASTOLIC BLOOD PRESSURE: 88 MMHG | HEART RATE: 76 BPM | WEIGHT: 206 LBS | TEMPERATURE: 98.1 F | HEIGHT: 66 IN | OXYGEN SATURATION: 98 % | SYSTOLIC BLOOD PRESSURE: 136 MMHG | BODY MASS INDEX: 33.11 KG/M2

## 2020-12-09 PROCEDURE — G8484 FLU IMMUNIZE NO ADMIN: HCPCS | Performed by: PSYCHIATRY & NEUROLOGY

## 2020-12-09 PROCEDURE — G8926 SPIRO NO PERF OR DOC: HCPCS | Performed by: PSYCHIATRY & NEUROLOGY

## 2020-12-09 PROCEDURE — 99215 OFFICE O/P EST HI 40 MIN: CPT | Performed by: PSYCHIATRY & NEUROLOGY

## 2020-12-09 PROCEDURE — 4004F PT TOBACCO SCREEN RCVD TLK: CPT | Performed by: PSYCHIATRY & NEUROLOGY

## 2020-12-09 PROCEDURE — 3023F SPIROM DOC REV: CPT | Performed by: PSYCHIATRY & NEUROLOGY

## 2020-12-09 PROCEDURE — G8427 DOCREV CUR MEDS BY ELIG CLIN: HCPCS | Performed by: PSYCHIATRY & NEUROLOGY

## 2020-12-09 PROCEDURE — 3017F COLORECTAL CA SCREEN DOC REV: CPT | Performed by: PSYCHIATRY & NEUROLOGY

## 2020-12-09 PROCEDURE — 99214 OFFICE O/P EST MOD 30 MIN: CPT

## 2020-12-09 PROCEDURE — G8417 CALC BMI ABV UP PARAM F/U: HCPCS | Performed by: PSYCHIATRY & NEUROLOGY

## 2020-12-09 RX ORDER — FLUOXETINE HYDROCHLORIDE 40 MG/1
CAPSULE ORAL
Qty: 30 CAPSULE | Refills: 2 | Status: SHIPPED | OUTPATIENT
Start: 2020-12-09 | End: 2021-03-29

## 2020-12-09 RX ORDER — TRAZODONE HYDROCHLORIDE 100 MG/1
100 TABLET ORAL NIGHTLY
Qty: 30 TABLET | Refills: 2 | Status: SHIPPED | OUTPATIENT
Start: 2020-12-09 | End: 2021-03-29

## 2020-12-09 RX ORDER — CYCLOBENZAPRINE HCL 10 MG
10 TABLET ORAL 3 TIMES DAILY PRN
Qty: 60 TABLET | Refills: 2 | Status: SHIPPED | OUTPATIENT
Start: 2020-12-09 | End: 2021-01-09

## 2020-12-09 RX ORDER — GABAPENTIN 300 MG/1
300 CAPSULE ORAL 3 TIMES DAILY
Qty: 90 CAPSULE | Refills: 2 | Status: SHIPPED | OUTPATIENT
Start: 2020-12-09 | End: 2021-02-24

## 2020-12-09 ASSESSMENT — ENCOUNTER SYMPTOMS
COUGH: 0
TROUBLE SWALLOWING: 0
VOMITING: 0
SINUS PRESSURE: 0
VISUAL CHANGE: 0
EYE ITCHING: 0
NAUSEA: 0
CHEST TIGHTNESS: 0
CONSTIPATION: 0
APNEA: 0
CHOKING: 0
ABDOMINAL DISTENTION: 0
DIARRHEA: 0
SORE THROAT: 0
FACIAL SWELLING: 0
SHORTNESS OF BREATH: 0
WHEEZING: 0
EYE PAIN: 0
PHOTOPHOBIA: 0
EYE DISCHARGE: 0
BOWEL INCONTINENCE: 0
ABDOMINAL PAIN: 0
COLOR CHANGE: 0
BACK PAIN: 1
VOICE CHANGE: 0
BLOOD IN STOOL: 0
EYE REDNESS: 0

## 2020-12-09 NOTE — PROGRESS NOTES
2)      FAMILY     H/O     DEMENTIA                                               -   MOTHER                          3)      H/O   CHRONIC   ANXIETY,   DEPRESSION                                                 FOR     MORE  THAN   30    YEARS                                 PREVIOUSLY      ON    WELLBUTRIN                                 TO  FOLLOW  WITH  MENTAL  HEALTH  PROFESSIONALS                                4)      MULTIPLE  CO  MORBID  MEDICAL  CONDITIONS                                     -   BEING  FOLLOWED  BY     HER  PCP                              5)      H/O     CHRONIC  SMOKING                            PATIENT  AWARE  OF  RISKS  AND                     SIDE  EFFECTS  OF   SMOKING   DISCUSSED. PATIENT   ADVISED   AND  COUNSELED    TO   QUIT  SMOKING.                        6)     H/O   CHRONIC    ARTHRITIS                             -   ON    NEURONTIN,    FLEXERIL ,     MOBIC                       7)      PREVIOUS     H/O    CONCUSSIONS                                           DUE  TO    FALLING                             8)        PREVIOUS      H/O   ALCOHOL    ABUSE                      9)         H/O   CHRONIC   INTERMITTENT   DIZZINESS                                  -    STABLE                               HAD  CARDIOLOGY   EVALUATIONS    IN     THE PAST                     10)          PREVIOUS      H/O   RECURRENT  FALLING                        11)      H/O   MILD    BALANCE PROBLEMS                                         FOR  MORE   2     YEARS                             12)     H/O   CHRONIC  NECK   AND  BACK PAIN                                 PREVIOUS     H/O    NECK   AND  LUMBAR    SURGERIES                               13)     PERIPHERAL  POLYNEUROPATHY     IN                              GLOVE  AND  STOCKING  MANNER                                     14)   H/O     EPISODES  OF  MEMORY  LAPSES AND  BRIEF  CONFUSION      LASTING    1 -2   MINUTES                                     INTERMITTENTLY      SINCE    2019                                D /  D     INCLUDE                                      NON  CONVULSIVE  SEIZURES                                          PARTIAL  COMPLEX  SEIZURES                                         NON  EPILEPTIC   EVENTS                                             15)            PATIENT   HAD    NEURO  DIAGNOSTIC  EVALUATIONS                           A)     MRI  BRAIN     IN   AUG. 2020                                          SHOWED  NO   ACUTE   PATHOLOGY                                           CHRONIC  CEREBRAL  ISCHEMIA                               B)          LABS     ARE  WITHIN  NORMAL  LIMITS. C)       EEG    IN OCT. 2020     ABNORMAL                               16)      PATIENT      HAS  BEEN  ON  WELLBUTRIN                             WHICH     CAN   INCREASE   THE  RISK  FOR    SEIZURES                                  PATIENT   ADVISED    IN  OCT. 2020   TO                                   A)     DISCONTINUE   WELLBUTRIN                                     B)      ALTERNATE    ANTI  DEPRESSANT  MEDICATIONS                                            -  PROZAC  AND   TRAZODONE                                        C)     TO  FOLLOW  WITH  MENTAL HEALTH PROFESSIONALS                                                   17)            PATIENT    STARTED   ON      ANTI  EPILEPTIC  MEDICATIONS                                         TRILEPTAL     IN      OCT.    2020                                    PATIENT  TOLERATING  THE  SAME                                          18)            IN   VIEW  OF   PATIENT'S     MULTIPLE     ISSUES    PATIENT                            RECOMMENDED    TO   FOLLOW     WITH   :                                   A)    PSYCHIATRY    AND  COUNSELING B)    RHEUMATOLOGY                                  C)     PAIN  MANAGEMENT                       19)            PATIENT    REQUESTS    REFILLS  FOR   HER  MULTIPLE  MEDICATIONS                                                    EXPECTATIONS,   GOALS   AND  SIDE  EFFECTS  MEDICATIONS    WERE                                REVIEWED     AND   DISCUSSED    IN    DETAIL. 20)      VARIOUS  RISK   FACTORS   WERE  REVIEWED   AND   DISCUSSED. PATIENT   HAS  MULTIPLE   MEDICAL, NEUROLOGICAL                                AND   MENTAL HEALTH   PROBLEMS                                     PATIENT'S   MANAGEMENT  IS  CHALLENGING.                                                             PRECIPITATING  FACTORS: including  fever/infection, exertion/relaxation, position change, stress, weather change,                        medications/alcohol, time of day/darkness/light  Are  absent                                                              MODIFYING  FACTORS:  fever/infection, exertion/relaxation, position change, stress, weather change,                        medications/alcohol, time of day/darkness/light  Are  absent             Patient   Indicates   The  Presence   And  The  Absence  Of  The  Following    Associated  And   Additional  Neurological    Symptoms:                                Balance  And coordination   problems  present           Gait problems     present            Headaches      absent              Migraines           absent           Memory problemspresent              Confusion        present            Paresthesia   numbness          absent           Seizures  And  Starring  Episodes           absent           Syncope,  Near  syncopal episodes         absent           Speech   problems           absent             Swallowing   Problems      absent            Dizziness,  Light headedness           present              Vertigo absent             Generalized   Weakness    present              focal  Weakness     absent             Tremors         absent              Sleep  Problems     present             History  Of   Recent  Head  Injury     absent             History  Of   Recent  TIA     absent             History  Of   Recent    Stroke     absent             Neck  Pain   and   Neck muscle  Spasms  present               Radiating  down   And   Weakness           absent            Lower back   Pain  And     Spasms  absent              Radiating    Down   And   Weakness          present                H/OFALLS         Present                 History  Of   Visual  Symptoms    absent                  Associated   Diplopia       absent                                               Also   Additional   Symptoms   Present    As  Documented    In   The   detailed                  Review  Of  Systems   And    Please   Refer   To    Them for   Additional    Information. Any components  That are either  Unobtainable  Or  Limited  In   HPI, ROS  And/or PFSH   Are                   Due   ToPatient's  Medical  Problems,  Clinical  Condition   and/or lack of                                other    Alternate   resources.           RECORDS   REVIEWED:    historical medical records       INFORMATION   REVIEWED:     MEDICAL   HISTORY,SURGICAL   HISTORY,     MEDICATIONS   LIST,   ALLERGIES AND  DRUG  INTOLERANCES,       FAMILY   HISTORY,  SOCIAL  HISTORY,      PROBLEM  LIST   FOR  PATIENT  CARE   COORDINATION      Past Medical History:   Diagnosis Date    Arthritis     [facility] [address]     Depression     Hyperlipidemia     Hypertension          Past Surgical History:   Procedure Laterality Date    BACK SURGERY      TUBAL LIGATION           Current Outpatient Medications   Medication Sig Dispense Refill    cyclobenzaprine (FLEXERIL) 10 MG tablet Take 1 tablet by mouth 3 times daily as needed for Muscle spasms 60 tablet 2    gabapentin (NEURONTIN) 300 MG capsule Take 1 capsule by mouth 3 times daily for 31 days. 90 capsule 2    FLUoxetine (PROZAC) 40 MG capsule TAKE ONE CAPSULE BY MOUTH ONCE DAILY 30 capsule 2    traZODone (DESYREL) 100 MG tablet Take 1 tablet by mouth nightly ONE  TABLET   AT  BEDTIME 30 tablet 2    OXcarbazepine (TRILEPTAL) 150 MG tablet WK 1 : take none in am - take 1 in pm  WK 2 : take 1 in am - take 1 in pm  WK 3 : take 1 in am - take 2 in pm  WK 4 : take 2 in am - take 2 in pm 120 tablet 1    loratadine (CLARITIN) 10 MG tablet Take 10 mg by mouth daily      amLODIPine (NORVASC) 10 MG tablet Take 10 mg by mouth daily      atorvastatin (LIPITOR) 40 MG tablet Take 40 mg by mouth daily      SODIUM FLUORIDE, DENTAL GEL, 1.1 % CREA Place onto teeth      budesonide-formoterol (SYMBICORT) 160-4.5 MCG/ACT AERO Inhale 2 puffs into the lungs 2 times daily      pantoprazole (PROTONIX) 40 MG tablet Take 40 mg by mouth daily      albuterol (PROVENTIL) (2.5 MG/3ML) 0.083% nebulizer solution Take 2.5 mg by nebulization every 6 hours as needed for Wheezing      meloxicam (MOBIC) 15 MG tablet Take 15 mg by mouth daily      albuterol sulfate HFA (VENTOLIN HFA) 108 (90 Base) MCG/ACT inhaler Inhale 2 puffs into the lungs every 6 hours as needed for Wheezing      nicotine polacrilex (COMMIT) 4 MG lozenge Take 4 mg by mouth as needed for Smoking cessation      nicotine (NICODERM CQ) 21 MG/24HR Place 1 patch onto the skin every 24 hours      lisinopril (PRINIVIL;ZESTRIL) 10 MG tablet Take 10 mg by mouth daily      fexofenadine (ALLEGRA) 60 MG tablet Take 60 mg by mouth daily       No current facility-administered medications for this visit. Allergies   Allergen Reactions    Methyldopa Anaphylaxis         History reviewed. No pertinent family history.       Social History     Socioeconomic History    Marital status: Single     Spouse name: Not on file    Number of children: Not on slowed. Behavior is not agitated, aggressive, withdrawn, hyperactive or combative. Behavior is cooperative. Thought Content: Thought content is not paranoid or delusional. Thought content does not include homicidal or suicidal ideation. Thought content does not include homicidal or suicidal plan. Cognition and Memory: Cognition is impaired. Memory is not impaired. She exhibits impaired recent memory. She does not exhibit impaired remote memory. Judgment: Judgment is not impulsive or inappropriate. NEUROLOGICALEXAMINATION :       A) MENTAL STATUS:                   Alert and  oriented  To time, place  And  Person. No Aphasia. No  Dysarthria. Able   To  Follow     SIMPLE    commands   without   Any  Difficulty. No right  To left confusion. Normal  Speech  And language function. Insight and  Judgment ,Fund  Of  Knowledge   within normal limits                Recent  And  Remote memory    DECREASED                Attention &  Concentration are    DECREASED                     B) CRANIAL NERVES :      CN : Visual  Acuity  And  Visual fields  within normal limits               Fundi  Could  Not  Be  Could  Not  Be  Evaluated. 3,4,6 CN : Both  Pupils are   Reactive and  Equal.  Movements  Are  Intact. No  Nystagmus. No  MODESTO. No  Afferent  Pupillary  Defect noted. 5 CN :  Normal  Facial sensations and Corneal  Reflexes           7 CN:  Normal  Facial  Symmetry  And  Strength. No facial  Weakness.            8 CN :  Hearing  Appears within normal limits          9, 10 CN: Normal   spontaneous, reflex   palate   movements         11 CN:   Normal  Shoulder  shrug and  strength         12 CN :   Normal  Tongue movements and  Tongue  In midline                        No tongue   Fasciculations or atrophy       C) MOTOR  EXAM:                 Strength  In upper  And  Lower   extremities     DECREASED    4 + /5                 No  Drift. No  Atrophy               Rapid   alternating  And  repetitions  Movements   DECREASED               Muscle  Tone  In upper  And  Lower  Extremities  normal                No rigidity. No  Spasticity. Bradykinesia   absent               No  Asterixis. Sustention  Tremor , Resting   Tremor   absent                    No   other  Abnormal  Movements noted           D) SENSORY :               Light   touch, pinprick,   position  And  Vibration   DECREASED                               IN  GLOVE  AND  STOCKING   MANNER      E) REFLEXES:                   Deep  Tendon  Reflexes    DECREASED                  No  pathological  Reflexes  Bilaterally.                                   F) COORDINATION  AND  GAIT :                                Station and  Gait  normal                              Romberg 's test     POSITIVE                            Ataxia negative          ASSESSMENT:        Patient Active Problem List   Diagnosis    [facility] [address]    Hyperlipidemia    Hypertension    Arthritis    Depression    Smoker    Dizziness    Memory problem    Confusion    Forgetfulness    Balance problem    Peripheral polyneuropathy    Chronic neck and back pain    H/O falling    Weakness of both hands    Weakness of both legs    Anxiety and depression    Family history of dementia    Old head injury    Cognitive complaints    Chronic cerebral ischemia    Abnormal EEG    Memory loss    Sleep difficulties    Cryptogenic partial complex epilepsy ([facility] [address]           MRI OF THE BRAIN WITHOUT CONTRAST  8/12/2020 8:31 am         TECHNIQUE:    Multiplanar multisequence MRI of the brain was performed without the    administration of intravenous contrast.         COMPARISON:    None.         HISTORY:    ORDERING SYSTEM PROVIDED HISTORY: Chronic obstructive pulmonary disease,    unspecified COPD type [facility])    TECHNOLOGIST PROVIDED HISTORY:    Is the patient pregnant?->No    Reason for Exam: Patient has had cognitive and memory issues. She has almost    passed out a few times. Symptoms for one year. Acuity: Chronic    Type of Exam: Initial    Additional signs and symptoms: Essential hypertension; Depression,    unspecified depression type.         FINDINGS:    INTRACRANIAL STRUCTURES/VENTRICLES: There is no acute infarct. No mass effect    or midline shift. No evidence of an acute intracranial hemorrhage.  Areas of    T2 FLAIR hyperintensity are seen in the periventricular and subcortical white    matter as well as the delano, which are nonspecific, but may represent chronic    microvascular ischemic change.   The ventricles and sulci are normal in size    and configuration.  The sellar/suprasellar regions appear unremarkable.  The    normal signal voids within the major intracranial vessels appear maintained.         ORBITS: The visualized portion of the orbits demonstrate no acute abnormality.         SINUSES: The visualized paranasal sinuses and mastoid air cells are well    aerated.         BONES/SOFT TISSUES: The bone marrow signal intensity appears normal. The soft    tissues demonstrate no acute abnormality.              Impression    1. No acute intracranial abnormality.  No acute infarct.     2. Mild chronic microvascular ischemic changes.               ULTRASOUND EVALUATION OF THE CAROTID ARTERIES         8/12/2020         COMPARISON:    None.         HISTORY:    ORDERING SYSTEM PROVIDED HISTORY: Chronic obstructive pulmonary disease,    unspecified COPD type ([facility])         FINDINGS:         RIGHT:         The right common carotid artery demonstrates peak systolic velocities of 87,    78 cm/sec in the proximal and distal segments respectively.         The right internal carotid artery demonstrates the systolic velocities of 65,    78, 72 cm/sec in the proximal, mid and distal segments respectively.         The external carotid artery is patent.  The vertebral artery demonstrates    normal antegrade flow.         Mild soft plaque at the origin of the right internal carotid artery is noted    with estimated cross-sectional stenosis by visual inspection of 36%.         ICA/CCA ratio of 0.8.              LEFT:         The left common carotid artery demonstrates peak systolic velocities of 990,    85 cm/sec in the proximal and distal segments respectively.         The left internal carotid artery demonstrates the systolic velocities of 92,    87, 100 cm/sec in the proximal, mid and distal segments respectively.         The external carotid artery is patent.  The vertebral artery demonstrates    normal antegrade flow.         Mild soft plaque is noted at the origin of the left internal carotid artery    with estimated stenosis of approximately 21%.         ICA/CCA ratio of 1.0.              Impression    The right internal carotid artery demonstrates 0-50% stenosis .         The left internal carotid artery demonstrates 0-50% stenosis .         Bilateral vertebral arteries are patent with flow in the normal direction.               VISITING DIAGNOSIS:          ICD-10-CM    1. Confusion  R41.0    2. Chronic neck and back pain  M54.2 EEG    M54.9 [facility] [place]    [id number] External Referral To Rheumatology   3. Anxiety and depression  F41.9 EEG    F32.9 External Referral To Psychiatry   4. Weakness of both legs  R29.898 EEG     External Referral To Rheumatology   5. Balance problem  R26.89 EEG     External Referral To Rheumatology   6. Weakness of both hands  R29.898 EEG     External Referral To Rheumatology   7. Abnormal EEG  R94.01 EEG   8. Cognitive complaints  R41.9 EEG     External Referral To Rheumatology   9. Memory problem  R41.3 EEG   10. Chronic cerebral ischemia  I67.82    11. Forgetfulness  R68.89    12. Smoker  F17.200    13.  Mixed High    Complexity   Due   To  The  Patient's  Multiple  Symptoms,      Advancing   Disease,  Complex  Treatment  Regimen,  Multiple medications           and   Risk  Of   Side  Effects,  Difficulty  In  Medication  Management  And  Diagnostic  Challenges       In  View  Of  The  Associated   Co  Morbid  Conditions   And  Problems. * FALL   PRECAUTIONS. THESE  REVIEWED   AND  DISCUSSED        *   BE  CAREFUL  WITH  ACTIVITIES   INCLUDING  DRIVING. *   AVOID   NECK  AND/ BACK  STRAINING  ACTIVITIES          *   TO   WEAR   BILATERAL   WRIST  BRACES       *   TO  AVOID  PRESSURE  OVER   ULNAR  ASPECT   OF   ELBOWS        *   ADEQUATE   FLUIDINTAKE   AND  ELECTROLYTE  BALANCE         * KEEP  DAIRY  OF   THE  NEUROLOGICAL  SYMPTOMS        RECORDING THE    DURATION  AND  FREQUENCY. *  AVOID    CONDITIONS  AND  FACTORS   THAT  MAKE                  NEUROLOGICAL  SYMPTOMS  WORSE.                       *TO  MAINTAIN  REGULAR  SLEEP  WAKE  CYCLES. *   TO  HAVE  ADEQUATE  REST  AND   SLEEP    HOURS.          *    TO   AVOID   TO  SLEEP  IN   SUPINE  POSITION. *      WEIGHT   LOSS. *    AVOID  ANY USAGE OF    TOBACCO,              EXCESSIVE  ALCOHOL  AND   ILLEGAL   SUBSTANCES              *  CONTINUE   MEDICATIONS    PRESCRIBED       AS    RECOMMENDED       *   Compliance   With  Medications   And  Instructions            *    Antiplatelet  therapy    As   Recommended  Was   Discussed      *    Prophylactic  Use   Of     Vitamin   B   Complex,  Folic  Acid,    Vitamin  B12    Multivitamin,       Calcium  With  magnesium  And  Vit D    Supplementations   Over  The  Counter  Discussed               *FOOT  CARE, DAILY  INSPECTION  OF  FEET   AND         PERIODIC  PODIATRY EVALUATIONS .           *  PATIENT  IS  ALSO   COUNSELED   TO  KEEP    ACTIVITIES:         A)   SIMPLE      B)  ORGANIZED      C)  WRITEDOWN                     *    RECOMMENDED    EVALUATIONS  AND FOLLOW UP:                   * PHYSICAL  THERAPY                                *CARDIOLOGY                    *  ORTHO            *    PULMONARY             *    RHEUMATOLOGY             *   PAIN  MANAGEMENT                                                       *          PATIENT    STARTED   ON      ANTI  EPILEPTIC  MEDICATIONS                                         TRILEPTAL     IN      OCT. 2020                                    PATIENT  TOLERATING  THE  SAME                                          *          IN   VIEW  OF   PATIENT'S     MULTIPLE     ISSUES    PATIENT                            RECOMMENDED    TO   FOLLOW     WITH   :                                   A)    PSYCHIATRY    AND  COUNSELING                                 B)    RHEUMATOLOGY                                  C)     PAIN  MANAGEMENT                       *           PATIENT    REQUESTS    REFILLS  FOR   HER  MULTIPLE  MEDICATIONS                                                    EXPECTATIONS,   GOALS   AND  SIDE  EFFECTS  MEDICATIONS    WERE                                REVIEWED     AND   DISCUSSED    IN    DETAIL. VARIOUS  RISK   FACTORS      AND  SEIZURE  PRECAUTIONS                           WERE  REVIEWED   AND   DISCUSSED. Controlled Substances Monitoring: Periodic Controlled Substance Monitoring: Possible medication side effects, risk of tolerance/dependence & alternative treatments discussed. , Assessed functional status.  Quynh Encarnacion MD)            Orders Placed This Encounter   Procedures   [address] [place] [address] [facility], [place]    External Referral To Rheumatology    External Referral To Psychiatry    EEG       Orders Placed This Encounter   Medications    cyclobenzaprine (FLEXERIL) 10 MG tablet     Sig: Take 1 tablet by mouth 3 times daily as needed for Muscle spasms     Dispense:  60 tablet     Refill:  2    gabapentin (NEURONTIN) 300 MG capsule     Sig: Take 1 capsule by mouth 3 times daily for 31 days. Dispense:  90 capsule     Refill:  2    FLUoxetine (PROZAC) 40 MG capsule     Sig: TAKE ONE CAPSULE BY MOUTH ONCE DAILY     Dispense:  30 capsule     Refill:  2    traZODone (DESYREL) 100 MG tablet     Sig: Take 1 tablet by mouth nightly ONE  TABLET   AT  BEDTIME     Dispense:  30 tablet     Refill:  2                 *PATIENT   TO  FOLLOW  UP  WITH   PRIMARY  CARE         OTHER  CONSULTANTS  AS  BEFORE.           *TO  FOLLOW  WITH   MENTAL  HEALTH  PROFESSIONALS ,  INCLUDING            PSYCHOLOGICAL  COUNSELING   AND  PSYCHIATRIC  EVALUTIONS,                   *  Maintain   Healthy  Life Style    With   Periodic  Monitoring  Of      Any  Medical  Conditions  Including   Elevated  Blood  Pressure,  Lipid  Profile,     Blood  Sugar levels  AndHeart  Disease. *   Period   Screening  For  Cancers  Involving  Breast,  Colon,    lungs  And  Other  Organs  As  Applicable,  In consultation   With  Your  Primary Care Providers. *Second  Neurological  Opinion  And  Evaluations  In  [facility]  Setting  If  Patient  Is  Interested. * Please   Contact   Neurology  Clinic   Early   If   Are  Any  New  Neurological   And  Any neurological  Concerns. *  If  The  Patient remains  Neurologically  Stable   Return   To  [facility] Neurology Department   IN      2       MONTHS  TIME   FOR  FURTHER              FOLLOW UP.                       *   The  Neurological   Findings,  Possible  Diagnosis,  Differential diagnoses                    And  Options  For    Further   Investigations                   And  management   Are  Discussed  Comprehensively. Medications   And  Prescription   Risks  And  Side effects  Are   Also  Discussed.                      *  If   There is  Any  Significant  Worsening Of  Current  Symptoms  And  Or                  If patient  Develops   Any additional  New  NeurologicalSymptoms                  Or  Significant  Concerns   Should  Call  911 or  Go  To  Emergency  Department  For  Further  Immediate  Evaluation. The   Above  Were  Reviewed  With  patient   and                       questions  Answered  In  Detail. More   Than  50% of face  To face Time   Was  Spent  On  Counseling                    And   Coordination  Of  Care   Of   Patient's  multiple   Neurological  Problems                         And   Comorbid  Medical   Conditions. Electronically signed by Leandro Patel MD    Board Certified in  Neurology &  In  [facility]      DISCLAIMER:   Although every effort was made to ensure the accuracy of this  electronictranscription, some errors in transcription may have occurred. GENERAL PATIENT INSTRUCTIONS:      A Healthy Lifestyle: Care Instructions   Your Care Instructions   A healthy lifestyle can help you feel good, stay at ahealthy weight, and have plenty of energy for both work and play. A healthy lifestyle is something you can share with your whole family.  A healthy lifestyle also can lower your risk for serious health problems, such ashigh blood pressure, heart disease, and diabetes.  You can follow a few steps listed below to improve your health and the health of your family.  Follow-up careis a key part of your treatment and safety. Be sure to make and go to all appointments, and call your doctor if you are having problems. Its also a good idea to know your test results and keep a list of the medicines you take.  How can you care for yourself at home?  Do not eat too much sugar, fat, or fast foods. You can still have dessert and treats nowand then. The goal is moderation.  Start small to improve your eating habits.  Pay attention to portion sizes, drink less juice and soda pop, and eat more fruits and vegetables.  Eat a healthy amount of food. A 3-ounce serving of meat, for example, is about the size of a deck of cards. Fill the rest of your plate with vegetables and whole grains.  Limit theamount of soda and sports drinks you have every day. Drink more water when you are thirsty.  Eat at least 5 servings of fruits and vegetables every day. It may seem like a lot, but it is not hard to reach this goal. Aserving or helping is 1 piece of fruit, 1 cup of vegetables, or 2 cups of leafy, raw vegetables. Have an apple or some carrot sticks as an afternoon snack instead of a candy bar. Try to have fruits and/or vegetables at everymeal.   Make exercise part of your daily routine. You may want to start with simple activities, such as walking, bicycling, or slow swimming. Try pete active 30 to 60 minutes every day. You do not need to do all 30 to 60 minutes all at once. For example, you can exercise 3 times a day for 10 or 20 minutes. Moderate exercise is safe for most people, but it is always agood idea to talk to your doctor before starting an exercise program.   Keep moving. Vince Pry the lawn, work in the garden, or [employer]. Take the stairs instead of the elevator at work.  If you smoke, quit. Peoplewho smoke have an increased risk for heart attack, stroke, cancer, and other lung illnesses. Quitting is hard, but there are ways to boost your chance of quitting tobacco for good.  Use nicotine gum, patches, or lozenges.  Ask your doctor about stop-smoking programs and medicines.  Keep trying.  In addition to reducing your risk of diseases in the future, you will notice some benefits soon after you stop using tobacco. If you have shortness of breath or asthma symptoms, they will likely getbetter within a few weeks after you quit.  Limit how much alcohol you drink.  Moderate amounts of alcohol (up to 2 drinks a day for men, 1drink a day for women) are okay. But drinking too much can lead to liver problems, high blood pressure, and other health problems.  health   If you have a family, there are many things you can do together to improve your health.  Eat meals together as a family as often as possible.  Eat healthy foods. This includes fruits, vegetables, lean meats and dairy, and whole grains.  Include your family in your fitness plan. Most peoplethink of activities such as jogging or tennis as the way to fitness, but there are many ways you and your family can be more active. Anything that makes you breathe hard and gets your heart pumping is exercise. Here are sometips:   Walk to do errands or to take your child to school or the bus.  Go for a family bike ride after dinner instead of watching TV.  Where can you learn more?  Go [email or web address] and sign in to your [employer] account. Enter L239 in the Search HealthInformation box to learn more about \"A Healthy Lifestyle: Care Instructions. \"     If you do not have anaccount, please click on the \"Sign Up Now\" link.  Current as of: July 26, 2016   Content Version: 11.2   © 9757-7100 [employer]. Care instructions adapted under license by [facility] ([facility]). If you have questions about a medical condition or this instruction, always ask your healthcare professional. [employer] disclaims any warranty or liability for your use of this information.

## 2021-02-24 ENCOUNTER — OFFICE VISIT (OUTPATIENT)
Dept: PULMONOLOGY | Age: 51
End: 2021-02-24
Payer: COMMERCIAL

## 2021-02-24 VITALS
HEART RATE: 89 BPM | HEIGHT: 66 IN | BODY MASS INDEX: 33.01 KG/M2 | OXYGEN SATURATION: 94 % | DIASTOLIC BLOOD PRESSURE: 76 MMHG | TEMPERATURE: 97.2 F | WEIGHT: 205.4 LBS | SYSTOLIC BLOOD PRESSURE: 130 MMHG

## 2021-02-24 DIAGNOSIS — F17.200 CURRENT SMOKER: ICD-10-CM

## 2021-02-24 DIAGNOSIS — J44.9 STAGE 3 SEVERE COPD BY GOLD CLASSIFICATION (HCC): Primary | ICD-10-CM

## 2021-02-24 DIAGNOSIS — J43.2 CENTRILOBULAR EMPHYSEMA (HCC): ICD-10-CM

## 2021-02-24 PROCEDURE — 99204 OFFICE O/P NEW MOD 45 MIN: CPT | Performed by: INTERNAL MEDICINE

## 2021-02-24 PROCEDURE — G8484 FLU IMMUNIZE NO ADMIN: HCPCS | Performed by: INTERNAL MEDICINE

## 2021-02-24 PROCEDURE — G8926 SPIRO NO PERF OR DOC: HCPCS | Performed by: INTERNAL MEDICINE

## 2021-02-24 PROCEDURE — 4004F PT TOBACCO SCREEN RCVD TLK: CPT | Performed by: INTERNAL MEDICINE

## 2021-02-24 PROCEDURE — 3017F COLORECTAL CA SCREEN DOC REV: CPT | Performed by: INTERNAL MEDICINE

## 2021-02-24 PROCEDURE — 99214 OFFICE O/P EST MOD 30 MIN: CPT | Performed by: INTERNAL MEDICINE

## 2021-02-24 PROCEDURE — G8427 DOCREV CUR MEDS BY ELIG CLIN: HCPCS | Performed by: INTERNAL MEDICINE

## 2021-02-24 PROCEDURE — 3023F SPIROM DOC REV: CPT | Performed by: INTERNAL MEDICINE

## 2021-02-24 PROCEDURE — G8417 CALC BMI ABV UP PARAM F/U: HCPCS | Performed by: INTERNAL MEDICINE

## 2021-02-24 RX ORDER — FLUTICASONE FUROATE, UMECLIDINIUM BROMIDE AND VILANTEROL TRIFENATATE 200; 62.5; 25 UG/1; UG/1; UG/1
1 POWDER RESPIRATORY (INHALATION) DAILY
Qty: 1 EACH | Refills: 6 | Status: SHIPPED | OUTPATIENT
Start: 2021-02-24 | End: 2021-03-30 | Stop reason: CLARIF

## 2021-02-24 RX ORDER — CYCLOBENZAPRINE HCL 10 MG
TABLET ORAL
COMMUNITY
End: 2021-07-21 | Stop reason: SDUPTHER

## 2021-02-24 RX ORDER — GUAIFENESIN 600 MG/1
600 TABLET, EXTENDED RELEASE ORAL DAILY
Qty: 30 TABLET | Refills: 0 | Status: SHIPPED | OUTPATIENT
Start: 2021-02-24 | End: 2021-03-26

## 2021-02-24 NOTE — PROGRESS NOTES
REASON FOR THE CONSULTATION:  Shortness of breath  HISTORY OF PRESENT ILLNESS:    Dianna Miles is a 45y.o. year old female here for evaluation of shortness of breath. Patient has been short of breath for few years but it has slowly been progressing. Patient gets short of breath walking short distances and at times she also wheezes. She has a lot of sputum throughout the day but no hemoptysis sputum is clear without any purulence. Patient's now smoking 1 pack/day but she was up to 4 packs/day for 5 years and she has smoked for total 40 years on average 2 packs/day. In 2019 she had cavitary pneumonia of the right lung and was treated at [facility]. Patient does not do drugs or have animals or birds at home. 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 She is not on home oxygen         LUNG CANCER SCREENING     1. CRITERIA MET    []     CT ORDERED  []      2. CRITERIA NOT MET   [x]      3. REFUSED                    []        REASON CRITERIA NOT MET     1. SMOKING LESS THAN 30 PY  []      2. AGE LESS THAN 55 or GREATER 77 YEARS  []      3. QUIT SMOKING 15 YEARS OR GREATER   []      4. RECENT CT WITH IN 11 MONTHS    []      5. LIFE EXPECTANCY < 5 YEARS   []      6.  SIGNS  AND SYMPTOMS OF LUNG CANCER   []         Immunization   Immunization History   Administered Date(s) Administered    Influenza Virus Vaccine 02/12/2020, 02/12/2020, 11/10/2020    PPD Test 06/08/2019    Pneumococcal Polysaccharide ([id number]) 06/13/2019        Pneumococcal Vaccine     [] Up to date    [] Indicated   [] Refused  [] Contraindicated       Influenza Vaccine   [] Up to date    [] Indicated   [] Refused  [] Contraindicated        Pulmonary Rehab   [] Completed   [] Indicated   [] Refused  [] Contraindicated   PAST MEDICAL HISTORY:       Diagnosis Date    Arthritis     COPD (chronic obstructive pulmonary disease) ([facility] [address]     Depression     Hyperlipidemia     Hypertension          Family History:   History reviewed. No pertinent family history. SURGICAL HISTORY:   Past Surgical History:   Procedure Laterality Date    BACK SURGERY      TUBAL LIGATION             SOCIAL AND OCCUPATIONAL HEALTH:      There  No history of TB or TB exposure. There  No asbestos ,[employer] dust exposure. The patient reports  No coal mine, [place], [place], [facility] [place] exposure. History of travel outside the country No  There  is use of   marijuana No   VapingNo  IV heroinNo  Crack cocaineNo  There  Nohot tub exposure. Pets -cats No, dogsNo  Birds No    Occupational history kitchen    TOBACCO:   reports that she has been smoking cigarettes. She has a 80.00 pack-year smoking history. She has never used smokeless tobacco.  ETOH:   has no history on file for alcohol. ALLERGIES:      Allergies   Allergen Reactions    Methyldopa Anaphylaxis         Home Meds:   Prior to Admission medications    Medication Sig Start Date End Date Taking? Authorizing Provider   gabapentin (NEURONTIN) 300 MG capsule TAKE ONE CAPSULE BY MOUTH THREE TIMES DAILY 2/24/21 7/70/17 Yes Afshin Steven MD   cyclobenzaprine (FLEXERIL) 10 MG tablet Flexeril 10 mg tablet   Take 1 tablet 3 times a day by oral route.    Yes Historical Provider, MD   Fluticasone-Umeclidin-Vilant (TRELEGY ELLIPTA) 200-62.5-25 MCG/INH AEPB Inhale 1 puff into the lungs daily 2/24/21  Yes Greg Spring MD   guaiFENesin (MUCINEX) 600 MG extended release tablet Take 1 tablet by mouth daily 2/24/21 3/26/21 Yes Greg Spring MD   FLUoxetine (PROZAC) 40 MG capsule TAKE ONE CAPSULE BY MOUTH ONCE DAILY 12/9/20  Yes Afshin Steven MD   traZODone (DESYREL) 100 MG tablet Take 1 tablet by mouth nightly ONE  TABLET   AT  BEDTIME 12/9/20  Yes Afshin Steven MD   loratadine (CLARITIN) 10 MG tablet Take 10 mg by mouth daily   Yes Historical Provider, MD   amLODIPine (NORVASC) 10 MG tablet Take 10 mg by mouth daily   Yes Historical Provider, MD   atorvastatin (LIPITOR) 40 MG tablet Take 40 mg by mouth daily   Yes Historical Provider, MD   pantoprazole (PROTONIX) 40 MG tablet Take 40 mg by mouth daily   Yes Historical Provider, MD   albuterol (PROVENTIL) (2.5 MG/3ML) 0.083% nebulizer solution Take 2.5 mg by nebulization every 6 hours as needed for Wheezing   Yes Historical Provider, MD   albuterol sulfate HFA (VENTOLIN HFA) 108 (90 Base) MCG/ACT inhaler Inhale 2 puffs into the lungs every 6 hours as needed for Wheezing   Yes Historical Provider, MD   OXcarbazepine (TRILEPTAL) 150 MG tablet WK 1 : take none in am - take 1 in pm  WK 2 : take 1 in am - take 1 in pm  WK 3 : take 1 in am - take 2 in pm  WK 4 : take 2 in am - take 2 in pm  Patient not taking: Reported on 2/24/2021 06/2/12   Jeison Stephens MD   SODIUM FLUORIDE, DENTAL GEL, 1.1 % CREA Place onto teeth    Historical Provider, MD   nicotine polacrilex (COMMIT) 4 MG lozenge Take 4 mg by mouth as needed for Smoking cessation    Historical Provider, MD   nicotine (NICODERM CQ) 21 MG/24HR Place 1 patch onto the skin every 24 hours    Historical Provider, MD   lisinopril (PRINIVIL;ZESTRIL) 10 MG tablet Take 10 mg by mouth daily    Historical Provider, MD   fexofenadine (ALLEGRA) 60 MG tablet Take 60 mg by mouth daily    Historical Provider, MD   meloxicam (MOBIC) 15 MG tablet Take 15 mg by mouth daily    Historical Provider, MD              REVIEW OF SYSTEMS:    CONSTITUTIONAL:  negative for  fevers, chills, sweats, fatigue, malaise, anorexia and weight loss  EYES:  negative for  double vision, blurred vision, dry eyes, eye discharge and redness  HEENT:  negative for  hearing loss, tinnitus, ear drainage, earaches and nasal congestion  RESPIRATORY:  See hpi  CARDIOVASCULAR:  negative for  chest pain,, palpitations, orthopnea, PND  GASTROINTESTINAL:  negative for nausea, vomiting, change in bowel habits, diarrhea, constipation, abdominal pain, pruritus, abdominal mass and abdominal distention  GENITOURINARY:  negative for frequency, dysuria, nocturia, urinary incontinence and hesitancy  INTEGUMENT  negative for rash, skin lesion(s), dryness, skin color change, changes in lesion, pruritus and changes in hair  HEMATOLOGIC/LYMPHATIC:  negative for easy bruising, bleeding, lymphadenopathy, petechiae and swelling/edema  ALLERGIC/IMMUNOLOGIC:  negative for recurrent infections, urticaria and drug reactions  ENDOCRINE:  negative for heat intolerance, cold intolerance, tremor, weight changes and change in bowel habits  MUSCULOSKELETAL:  negative for  myalgias, arthralgias, pain, joint swelling, stiff joints and decreased range of motion  NEUROLOGICAL:  negative for headaches, dizziness, seizures, memory problems, speech problems, visual disturbance and coordination problems  BEHAVIOR/PSYCH:  negative for poor appetite, increased appetite, decreased sleep, increased sleep, decreased energy level, increased energy level and poor concentration  Skin no rash no dermatitis  Vitals:  /76 (Site: Right Upper Arm, Position: Sitting, Cuff Size: Medium Adult)   Pulse 89   Temp 97.2 °F (36.2 °C)   Ht 5' 6\" (1.676 m)   Wt 205 lb 6.4 oz (93.2 kg)   SpO2 94%   BMI 33.15 kg/m²     PHYSICAL EXAM:  General Appearance:    Alert, cooperative, no distress, appears stated age   Head:    Normocephalic, without obvious abnormality, atraumatic      Eyes:    PERRL, conjunctiva/corneas clear, EOM's intact   Ears:    Normal  external ear canals, both ears   Nose:   Nares normal, septum midline, mucosa normal, no drainage        or sinus tenderness   Throat:   Lips, mucosa, and tongue normal; teeth and gums normal   Neck:   Supple, symmetrical, trachea midline, no adenopathy;     thyroid:  no enlargement/tenderness/nodules; no carotid    bruit , NOJVD   Back:     Symmetric, no curvature, ROM normal, no CVA tenderness   Lungs:    Prolonged expiratory phase with barrel chest no rhonchi no wheezing.    Chest Wall:    No tenderness or deformity      Heart: Regular rate and rhythm, S1 and S2 normal, no murmur, rub        or gallop no rvh                           Abdomen:                                                 Pulses:                              Skin:                  Lymph nodes:                    Neurologic:                  Soft, non-tender, bowel sounds active all four quadrants,     no masses, no organomegaly         2+ and symmetric all extremities     Skin color, texture, turgor normal, no rashes or lesions       Cervical, supraclavicular not enlarged or matted or tender      CNII-XII intact, normal strength 5/5 . Sensation grossly normal  and reflexes normal 2+  throughout     Clubbing No  Lower ext edema No  Upper ext edema No         Reason for Exam: History of COPD, cough, and smoking 0.5 to 1ppd for 40 years   Acuity: Chronic   Type of Exam: Initial       FINDINGS:   Heart appears normal in size.  Mild lung scarring.  No focal consolidation,   pneumothorax or pleural effusion.  No free air.  Osseous structures   demonstrate degenerative changes.           Impression   No acute findings.                                                PULMONARY FUNCTION     PATIENT NAME: Vick Goss                  :        1970  MED REC NO:   [id number]                             ROOM:  ACCOUNT NO:   [de-identified]                           ADMIT DATE: 10/13/2020  PROVIDER:     Concha Seth     DATE OF PROCEDURE:  10/13/2020     INTERPRETATION:  The patient's spirometry shows obstructive flow volume  loop of severe nature. FEV1 is 35% predicted. FVC is 50% predicted. There was no positive bronchodilator change. FEV1/FVC ratio is 56%. Lung volumes by body box, total lung capacity is 93% predicted, %  predicted, and diffusion capacity uncorrected 68% predicted, corrected  for alveolar volume 101% predicted. Airway resistance is normal.     FINAL IMPRESSION:  This study shows severe stage III COPD without  positive bronchodilator change. There is air trapping and there is  moderately decreased diffusion capacity, which could be due to  underlying emphysema. Clinical correlation advised.          IMPRESSION:     Diagnosis Orders   1. Stage 3 severe COPD by GOLD classification (HCC)  Fluticasone-Umeclidin-Vilant (TRELEGY ELLIPTA) 200-62.5-25 MCG/INH AEPB    guaiFENesin (MUCINEX) 600 MG extended release tablet   2. Centrilobular emphysema ([address]  Fluticasone-Umeclidin-Vilant (TRELEGY ELLIPTA) 200-62.5-25 MCG/INH AEPB   3. Current smoker          :                PLAN:      Patient has stage III COPD and emphysema. I had long discussion with patient about smoking cessation. Explained to her that smoking cessation is paramount to prevent further progression of the disease. Patient does not want to use Chantix and is not ready to quit smoking at present. I have provided her information on Chantix for her to review and will be happy to prescribe medication when she is ready. Explained the side effects of Chantix in detail.   will stop Symbicort and transition to Trelegy to achieve maximum bronchodilation. Use albuterol before exercise and as needed. Use Mucinex as needed to help with secretion clearance  Follow-up in 3 months      Requested Prescriptions     Signed Prescriptions Disp Refills    Fluticasone-Umeclidin-Vilant (TRELEGY ELLIPTA) 200-62.5-25 MCG/INH AEPB 1 each 6     Sig: Inhale 1 puff into the lungs daily    guaiFENesin (MUCINEX) 600 MG extended release tablet 30 tablet 0     Sig: Take 1 tablet by mouth daily       Medications Discontinued During This Encounter   Medication Reason    budesonide-formoterol (SYMBICORT) 160-4.5 MCG/ACT AERO Alternate therapy       Chio received counseling on the following healthy behaviors: nutrition, exercise and medication adherence    Patient given educational materials : see patient instruction       Discussed use, benefit, and side effects of prescribed medications.   Barriers to medication compliance addressed. All patient questions answered. Pt voiced understanding. I hope this updates you on my evaluation and clinical thinking. Thank you for allowing me to participate in his care. Sincerely,      Electronically signed by Abbe Pitts MD on   2/24/21 at 3:34 PM EST       Please note that this chart was generated using voice recognition Dragon dictation software. Although every effort was made to ensure the accuracy of this automated transcription, some errors in transcription may have occurred.

## 2021-02-24 NOTE — PROGRESS NOTES
6 minute walk:    Patient ambulated approximately 700 feet. Patient saturation was 93% on room air at rest. During walk saturation dropped to 88%. Patient was placed on 2 liters of oxygen during test and saturation increased to 96%.

## 2021-02-25 ENCOUNTER — TELEPHONE (OUTPATIENT)
Dept: PULMONOLOGY | Age: 51
End: 2021-02-25

## 2021-02-25 DIAGNOSIS — J44.9 STAGE 3 SEVERE COPD BY GOLD CLASSIFICATION (HCC): Primary | ICD-10-CM

## 2021-02-25 NOTE — PROGRESS NOTES
Patient Duarte Schwartz needs portable oxygen concentrator to avoid adverse medical outcome . Diagnosis Orders   1.  Stage 3 severe COPD by GOLD classification [facility])  DME Order for Home Oxygen as OP      Electronically signed by Maryann Liao MD on 2/25/2021 at 3:22 PM

## 2021-03-26 DIAGNOSIS — F32.0 CURRENT MILD EPISODE OF MAJOR DEPRESSIVE DISORDER WITHOUT PRIOR EPISODE (HCC): ICD-10-CM

## 2021-03-26 DIAGNOSIS — R41.0 CONFUSION: ICD-10-CM

## 2021-03-26 DIAGNOSIS — R29.898 WEAKNESS OF BOTH HANDS: ICD-10-CM

## 2021-03-26 DIAGNOSIS — R41.3 MEMORY LOSS: ICD-10-CM

## 2021-03-26 DIAGNOSIS — R94.01 ABNORMAL EEG: ICD-10-CM

## 2021-03-26 DIAGNOSIS — G47.9 SLEEP DIFFICULTIES: ICD-10-CM

## 2021-03-26 DIAGNOSIS — Z81.8 FAMILY HISTORY OF DEMENTIA: ICD-10-CM

## 2021-03-26 DIAGNOSIS — M19.90 ARTHRITIS: ICD-10-CM

## 2021-03-26 DIAGNOSIS — R68.89 FORGETFULNESS: ICD-10-CM

## 2021-03-26 DIAGNOSIS — J44.9 CHRONIC OBSTRUCTIVE PULMONARY DISEASE, UNSPECIFIED COPD TYPE (HCC): ICD-10-CM

## 2021-03-26 DIAGNOSIS — Z87.828 OLD HEAD INJURY: ICD-10-CM

## 2021-03-26 DIAGNOSIS — G62.9 PERIPHERAL POLYNEUROPATHY: ICD-10-CM

## 2021-03-26 DIAGNOSIS — R41.3 MEMORY PROBLEM: ICD-10-CM

## 2021-03-26 DIAGNOSIS — F41.9 ANXIETY AND DEPRESSION: ICD-10-CM

## 2021-03-26 DIAGNOSIS — I10 ESSENTIAL HYPERTENSION: ICD-10-CM

## 2021-03-26 DIAGNOSIS — R42 DIZZINESS: ICD-10-CM

## 2021-03-26 DIAGNOSIS — I67.82 CHRONIC CEREBRAL ISCHEMIA: ICD-10-CM

## 2021-03-26 DIAGNOSIS — E78.2 MIXED HYPERLIPIDEMIA: ICD-10-CM

## 2021-03-26 DIAGNOSIS — M54.2 CHRONIC NECK AND BACK PAIN: ICD-10-CM

## 2021-03-26 DIAGNOSIS — M54.9 CHRONIC NECK AND BACK PAIN: ICD-10-CM

## 2021-03-26 DIAGNOSIS — R41.9 COGNITIVE COMPLAINTS: ICD-10-CM

## 2021-03-26 DIAGNOSIS — R26.89 BALANCE PROBLEM: ICD-10-CM

## 2021-03-26 DIAGNOSIS — G89.29 CHRONIC NECK AND BACK PAIN: ICD-10-CM

## 2021-03-26 DIAGNOSIS — F32.A ANXIETY AND DEPRESSION: ICD-10-CM

## 2021-03-26 DIAGNOSIS — G40.209 CRYPTOGENIC PARTIAL COMPLEX EPILEPSY (HCC): ICD-10-CM

## 2021-03-26 DIAGNOSIS — R29.898 WEAKNESS OF BOTH LEGS: ICD-10-CM

## 2021-03-26 DIAGNOSIS — Z91.81 H/O FALLING: ICD-10-CM

## 2021-03-26 DIAGNOSIS — F17.200 SMOKER: ICD-10-CM

## 2021-03-29 ENCOUNTER — TELEPHONE (OUTPATIENT)
Dept: PULMONOLOGY | Age: 51
End: 2021-03-29

## 2021-03-29 DIAGNOSIS — J44.9 STAGE 3 SEVERE COPD BY GOLD CLASSIFICATION (HCC): Primary | ICD-10-CM

## 2021-03-29 DIAGNOSIS — J43.2 CENTRILOBULAR EMPHYSEMA (HCC): ICD-10-CM

## 2021-03-29 RX ORDER — TRAZODONE HYDROCHLORIDE 100 MG/1
TABLET ORAL
Qty: 30 TABLET | Refills: 2 | Status: SHIPPED | OUTPATIENT
Start: 2021-03-29

## 2021-03-29 RX ORDER — FLUOXETINE HYDROCHLORIDE 40 MG/1
CAPSULE ORAL
Qty: 30 CAPSULE | Refills: 2 | Status: SHIPPED | OUTPATIENT
Start: 2021-03-29

## 2021-03-29 NOTE — TELEPHONE ENCOUNTER
Received fax from [address] stating that [address] [facility] 200-62.5-25h/inh is not covered by patients insurance.  Other medications that are covered are symbicort, dulera, atrovent hfa, spiriva, combivent respimat, flovent hfa, spiriva with handihaler bevespi

## 2021-03-30 RX ORDER — BUDESONIDE AND FORMOTEROL FUMARATE DIHYDRATE 80; 4.5 UG/1; UG/1
2 AEROSOL RESPIRATORY (INHALATION) 2 TIMES DAILY
Qty: 1 INHALER | Refills: 6 | Status: SHIPPED | OUTPATIENT
Start: 2021-03-30 | End: 2022-07-28

## 2021-04-22 ENCOUNTER — TELEPHONE (OUTPATIENT)
Dept: NEUROLOGY | Age: 51
End: 2021-04-22

## 2021-04-22 NOTE — TELEPHONE ENCOUNTER
This writer contacted patient, left vm in re: rheumatology and psychiatry referrals ordered by Dr. Antonietta Casillas - to contact office with decision.

## 2021-05-14 ENCOUNTER — OFFICE VISIT (OUTPATIENT)
Dept: PAIN MANAGEMENT | Age: 51
End: 2021-05-14
Payer: COMMERCIAL

## 2021-05-14 ENCOUNTER — TELEPHONE (OUTPATIENT)
Dept: PAIN MANAGEMENT | Age: 51
End: 2021-05-14

## 2021-05-14 ENCOUNTER — HOSPITAL ENCOUNTER (OUTPATIENT)
Dept: GENERAL RADIOLOGY | Age: 51
Discharge: HOME | End: 2021-05-16
Payer: COMMERCIAL

## 2021-05-14 VITALS
HEIGHT: 65 IN | BODY MASS INDEX: 33.49 KG/M2 | DIASTOLIC BLOOD PRESSURE: 80 MMHG | WEIGHT: 201 LBS | SYSTOLIC BLOOD PRESSURE: 130 MMHG

## 2021-05-14 DIAGNOSIS — M47.817 LUMBOSACRAL SPONDYLOSIS WITHOUT MYELOPATHY: ICD-10-CM

## 2021-05-14 DIAGNOSIS — M54.2 CERVICALGIA: ICD-10-CM

## 2021-05-14 DIAGNOSIS — M51.36 DDD (DEGENERATIVE DISC DISEASE), LUMBAR: ICD-10-CM

## 2021-05-14 DIAGNOSIS — M25.551 RIGHT HIP PAIN: ICD-10-CM

## 2021-05-14 DIAGNOSIS — M54.16 LUMBAR RADICULOPATHY: ICD-10-CM

## 2021-05-14 DIAGNOSIS — M25.551 RIGHT HIP PAIN: Primary | ICD-10-CM

## 2021-05-14 PROBLEM — M51.369 DDD (DEGENERATIVE DISC DISEASE), LUMBAR: Status: ACTIVE | Noted: 2021-05-14

## 2021-05-14 PROCEDURE — 4004F PT TOBACCO SCREEN RCVD TLK: CPT | Performed by: NURSE PRACTITIONER

## 2021-05-14 PROCEDURE — 73502 X-RAY EXAM HIP UNI 2-3 VIEWS: CPT

## 2021-05-14 PROCEDURE — 72040 X-RAY EXAM NECK SPINE 2-3 VW: CPT

## 2021-05-14 PROCEDURE — G8427 DOCREV CUR MEDS BY ELIG CLIN: HCPCS | Performed by: NURSE PRACTITIONER

## 2021-05-14 PROCEDURE — 3017F COLORECTAL CA SCREEN DOC REV: CPT | Performed by: NURSE PRACTITIONER

## 2021-05-14 PROCEDURE — 99214 OFFICE O/P EST MOD 30 MIN: CPT

## 2021-05-14 PROCEDURE — G8417 CALC BMI ABV UP PARAM F/U: HCPCS | Performed by: NURSE PRACTITIONER

## 2021-05-14 PROCEDURE — 99204 OFFICE O/P NEW MOD 45 MIN: CPT | Performed by: NURSE PRACTITIONER

## 2021-05-14 RX ORDER — DICLOFENAC SODIUM 75 MG/1
75 TABLET, DELAYED RELEASE ORAL 2 TIMES DAILY
Qty: 60 TABLET | Refills: 3 | Status: SHIPPED | OUTPATIENT
Start: 2021-05-14 | End: 2021-05-14 | Stop reason: SDUPTHER

## 2021-05-14 RX ORDER — MAGNESIUM OXIDE 400 MG/1
400 TABLET ORAL 2 TIMES DAILY
Qty: 60 TABLET | Refills: 5 | Status: SHIPPED | OUTPATIENT
Start: 2021-05-14 | End: 2021-05-14 | Stop reason: SDUPTHER

## 2021-05-14 RX ORDER — DICLOFENAC SODIUM 75 MG/1
75 TABLET, DELAYED RELEASE ORAL 2 TIMES DAILY
Qty: 60 TABLET | Refills: 3 | Status: SHIPPED | OUTPATIENT
Start: 2021-05-14 | End: 2021-07-21

## 2021-05-14 RX ORDER — MAGNESIUM OXIDE 400 MG/1
400 TABLET ORAL 2 TIMES DAILY
Qty: 60 TABLET | Refills: 5 | Status: SHIPPED | OUTPATIENT
Start: 2021-05-14 | End: 2021-11-23

## 2021-05-14 ASSESSMENT — ENCOUNTER SYMPTOMS
CONSTIPATION: 0
BACK PAIN: 1
SORE THROAT: 0
SHORTNESS OF BREATH: 1

## 2021-05-14 NOTE — PROGRESS NOTES
Days per week: None     Minutes per session: None    Stress: None   Relationships    Social connections     Talks on phone: None     Gets together: None     Attends Alevism service: None     Active member of club or organization: None     Attends meetings of clubs or organizations: None     Relationship status: None    Intimate partner violence     Fear of current or ex partner: None     Emotionally abused: None     Physically abused: None     Forced sexual activity: None   Other Topics Concern    None   Social History Narrative    None     Review of Systems   Constitutional: Negative for fever. HENT: Negative for sore throat. Respiratory: Positive for shortness of breath (history COPD). Cardiovascular: Positive for chest pain (intermittent, follows cardiology). Gastrointestinal: Negative for constipation. History IBS, colitis   Genitourinary:        Intermittent incontinence     Musculoskeletal: Positive for arthralgias, back pain and myalgias. Neurological: Positive for dizziness. Psychiatric/Behavioral: Positive for sleep disturbance. Objective:   Physical Exam  Vitals signs reviewed. Constitutional:       General: She is not in acute distress. Appearance: Normal appearance. She is well-developed and overweight. She is not ill-appearing, toxic-appearing or diaphoretic. Interventions: She is not intubated. HENT:      Head: Normocephalic and atraumatic. Right Ear: External ear normal.      Left Ear: External ear normal.      Nose: Nose normal.      Mouth/Throat:      Lips: Pink. Mouth: Mucous membranes are moist.   Eyes:      General: Lids are normal.   Cardiovascular:      Rate and Rhythm: Normal rate. Pulmonary:      Effort: Pulmonary effort is normal. No tachypnea, bradypnea, accessory muscle usage, prolonged expiration, respiratory distress or retractions. She is not intubated. Abdominal:      General: Abdomen is flat.    Musculoskeletal:      Right hip: She exhibits decreased range of motion. Right foot: No Charcot foot or foot drop. Left foot: No Charcot foot or foot drop. Skin:     General: Skin is warm and dry. Capillary Refill: Capillary refill takes less than 2 seconds. Coloration: Skin is not ashen or jaundiced. Findings: No rash. Nails: There is no clubbing. Neurological:      Mental Status: She is alert and oriented to person, place, and time. GCS: GCS eye subscore is 4. GCS verbal subscore is 5. GCS motor subscore is 6. Cranial Nerves: No cranial nerve deficit. Psychiatric:         Speech: Speech normal.         Behavior: Behavior is cooperative. Assessment / Plan:      Diagnosis Orders   1. Right hip pain  magnesium oxide (MAG-OX) 400 MG tablet    XR CERVICAL SPINE (2-3 VIEWS)    diclofenac (VOLTAREN) 75 MG EC tablet    External Referral To Physical Therapy    XR HIP 2-3 VW W PELVIS RIGHT   2. Cervicalgia  magnesium oxide (MAG-OX) 400 MG tablet    XR CERVICAL SPINE (2-3 VIEWS)    diclofenac (VOLTAREN) 75 MG EC tablet    External Referral To Physical Therapy    XR HIP 2-3 VW W PELVIS RIGHT   3. Lumbar radiculopathy  External Referral To Physical Therapy   4. DDD (degenerative disc disease), lumbar  External Referral To Physical Therapy   5. Lumbosacral spondylosis without myelopathy  External Referral To Physical Therapy        START MAGNESIUM TWICE DAILY    STOP MOBIC.  START DICLOFENAC 75MG TWICE DAILY    PHYSICAL THERAPY    CERVICAL SPINE AND RIGHT HIP XRAY

## 2021-05-14 NOTE — PATIENT INSTRUCTIONS
START MAGNESIUM TWICE DAILY    STOP MOBIC.  START DICLOFENAC 75MG TWICE DAILY    PHYSICAL THERAPY    XRAYS TODAY

## 2021-05-26 ENCOUNTER — OFFICE VISIT (OUTPATIENT)
Dept: PULMONOLOGY | Age: 51
End: 2021-05-26
Payer: COMMERCIAL

## 2021-05-26 VITALS
TEMPERATURE: 98.1 F | BODY MASS INDEX: 33.95 KG/M2 | SYSTOLIC BLOOD PRESSURE: 118 MMHG | HEART RATE: 79 BPM | WEIGHT: 203.8 LBS | OXYGEN SATURATION: 95 % | DIASTOLIC BLOOD PRESSURE: 84 MMHG | HEIGHT: 65 IN

## 2021-05-26 DIAGNOSIS — J43.2 CENTRILOBULAR EMPHYSEMA (HCC): ICD-10-CM

## 2021-05-26 DIAGNOSIS — J44.1 COPD EXACERBATION (HCC): ICD-10-CM

## 2021-05-26 DIAGNOSIS — F17.200 CURRENT SMOKER: ICD-10-CM

## 2021-05-26 DIAGNOSIS — J96.11 CHRONIC RESPIRATORY FAILURE WITH HYPOXIA (HCC): ICD-10-CM

## 2021-05-26 DIAGNOSIS — J44.9 STAGE 3 SEVERE COPD BY GOLD CLASSIFICATION (HCC): Primary | ICD-10-CM

## 2021-05-26 PROCEDURE — 99213 OFFICE O/P EST LOW 20 MIN: CPT | Performed by: INTERNAL MEDICINE

## 2021-05-26 PROCEDURE — G8926 SPIRO NO PERF OR DOC: HCPCS | Performed by: INTERNAL MEDICINE

## 2021-05-26 PROCEDURE — 3017F COLORECTAL CA SCREEN DOC REV: CPT | Performed by: INTERNAL MEDICINE

## 2021-05-26 PROCEDURE — 3023F SPIROM DOC REV: CPT | Performed by: INTERNAL MEDICINE

## 2021-05-26 PROCEDURE — 4004F PT TOBACCO SCREEN RCVD TLK: CPT | Performed by: INTERNAL MEDICINE

## 2021-05-26 PROCEDURE — G8417 CALC BMI ABV UP PARAM F/U: HCPCS | Performed by: INTERNAL MEDICINE

## 2021-05-26 PROCEDURE — G8427 DOCREV CUR MEDS BY ELIG CLIN: HCPCS | Performed by: INTERNAL MEDICINE

## 2021-05-26 RX ORDER — AMOXICILLIN AND CLAVULANATE POTASSIUM 875; 125 MG/1; MG/1
1 TABLET, FILM COATED ORAL 2 TIMES DAILY
Qty: 14 TABLET | Refills: 0 | Status: SHIPPED | OUTPATIENT
Start: 2021-05-26 | End: 2021-06-02

## 2021-05-26 RX ORDER — PREDNISONE 10 MG/1
30 TABLET ORAL DAILY
Qty: 15 TABLET | Refills: 0 | Status: SHIPPED | OUTPATIENT
Start: 2021-05-26 | End: 2021-05-31

## 2021-05-26 RX ORDER — BUSPIRONE HYDROCHLORIDE 5 MG/1
TABLET ORAL
COMMUNITY
Start: 2021-05-21

## 2021-05-26 NOTE — PROGRESS NOTES
Pulmonary Rehab   [] Completed   [] Indicated   [] Refused  [] Contraindicated   PAST MEDICAL HISTORY:       Diagnosis Date    Arthritis     COPD (chronic obstructive pulmonary disease) ([address]     Depression     Hyperlipidemia     Hypertension          Family History:   History reviewed. No pertinent family history. SURGICAL HISTORY:   Past Surgical History:   Procedure Laterality Date    BACK SURGERY      TUBAL LIGATION             SOCIAL AND OCCUPATIONAL HEALTH:      There  No history of TB or TB exposure. There  No asbestos ,[employer] dust exposure. The patient reports  No coal mine, [place], [place], [facility] exposure. History of travel outside the country No  There  is use of   marijuana No   VapingNo  IV heroinNo  Crack cocaineNo  There  Nohot tub exposure. Pets -cats No, dogsNo  Birds No    Occupational history kitchen    TOBACCO:   reports that she has been smoking cigarettes. She has a 80.00 pack-year smoking history. She has never used smokeless tobacco.  ETOH:   has no history on file for alcohol use. ALLERGIES:      Allergies   Allergen Reactions    Methyldopa Anaphylaxis         Home Meds:   Prior to Admission medications    Medication Sig Start Date End Date Taking?  Authorizing Provider   busPIRone (BUSPAR) 5 MG tablet TAKE ONE TABLET TWICE DAILY FOR ANXIETY 5/21/21  Yes Historical Provider, MD   magnesium oxide (MAG-OX) 400 MG tablet Take 1 tablet by mouth 2 times daily 5/14/21  Yes KEIRY Hastings CNP   diclofenac (VOLTAREN) 75 MG EC tablet Take 1 tablet by mouth 2 times daily 5/14/21  Yes KEIRY Hastings CNP   budesonide-formoterol (SYMBICORT) 80-4.5 MCG/ACT AERO Inhale 2 puffs into the lungs 2 times daily 3/30/21 5/26/21 Yes Gian Wagner MD   tiotropium (SPIRIVA RESPIMAT) 2.5 MCG/ACT AERS inhaler Inhale 2 puffs into the lungs daily 3/30/21 5/26/21 Yes Gian Wagner MD   FLUoxetine (PROZAC) 40 MG capsule TAKE ONE CAPSULE BY MOUTH ONCE DAILY 3/29/21  Yes Lito Bro MD   traZODone (DESYREL) 100 MG tablet TAKE ONE TABLET BY MOUTH ONCE DAILY AT BEDTIME 3/29/21  Yes Lito Bro MD   gabapentin (NEURONTIN) 300 MG capsule TAKE ONE CAPSULE BY MOUTH THREE TIMES DAILY 2/24/21 1/14/02 Yes Lito Bro MD   cyclobenzaprine (FLEXERIL) 10 MG tablet Flexeril 10 mg tablet   Take 1 tablet 3 times a day by oral route. Yes Historical Provider, MD   loratadine (CLARITIN) 10 MG tablet Take 10 mg by mouth daily   Yes Historical Provider, MD   amLODIPine (NORVASC) 10 MG tablet Take 10 mg by mouth daily   Yes Historical Provider, MD   atorvastatin (LIPITOR) 40 MG tablet Take 40 mg by mouth daily   Yes Historical Provider, MD   fexofenadine (ALLEGRA) 60 MG tablet Take 60 mg by mouth daily   Yes Historical Provider, MD   pantoprazole (PROTONIX) 40 MG tablet Take 40 mg by mouth daily   Yes Historical Provider, MD   albuterol (PROVENTIL) (2.5 MG/3ML) 0.083% nebulizer solution Take 2.5 mg by nebulization every 6 hours as needed for Wheezing   Yes Historical Provider, MD   albuterol sulfate HFA (VENTOLIN HFA) 108 (90 Base) MCG/ACT inhaler Inhale 2 puffs into the lungs every 6 hours as needed for Wheezing   Yes Historical Provider, MD   OXcarbazepine (TRILEPTAL) 150 MG tablet WK 1 : take none in am - take 1 in pm  WK 2 : take 1 in am - take 1 in pm  WK 3 : take 1 in am - take 2 in pm  WK 4 : take 2 in am - take 2 in pm  Patient not taking: Reported on 5/26/2021 75/4/89   Lito Bro MD            Review of Systems -  General ROS: negative for - chills, fatigue, fever or weight loss  ENT ROS: negative for - headaches, oral lesions or sore throat  Cardiovascular ROS: no chest pain , orthopnea or pnd   Gastrointestinal ROS: no abdominal pain, change in bowel habits, or black or bloody stools  Skin - no rash   Neuro - no blurry vision , no loc .  No focal weakness   msk - no jt tenderness or swelling    Vascular - no claudication , rest completed and negative   Lymphatic - complete and negative   Hematology - oncology - complete and negative   Allergy immunology - complete and negative    no burning or hematuria    Vitals:  /84 (Site: Right Upper Arm, Position: Sitting, Cuff Size: Medium Adult)   Pulse 79   Temp 98.1 °F (36.7 °C)   Ht 5' 5\" (1.651 m)   Wt 203 lb 12.8 oz (92.4 kg)   SpO2 95%   BMI 33.91 kg/m²     PHYSICAL EXAM:  Head and neck atraumatic, normocephalic    Lymph nodes-no cervical, supraclavicular lymphadenopathy    Neck-no JVP elevation    Lungs - AP diameter of chest increased. Thoracic expansion and diaphragmatic excursion diminished. BS diminished and expiratory phase prolonged. No dullness to percussion or tenderness to palpation. No bronchial breath sounds . CVS- S1, S2 regular. No S3 no S4, no murmurs    Abdomen-nontender, nondistended. Bowel sounds are present. No organomegaly    Lower extremity-no edema    Upper extremity-no edema    Neurological-grossly normal cranial nerves. No overt motor deficit      Reason for Exam: History of COPD, cough, and smoking 0.5 to 1ppd for 40 years   Acuity: Chronic   Type of Exam: Initial       FINDINGS:   Heart appears normal in size.  Mild lung scarring.  No focal consolidation,   pneumothorax or pleural effusion.  No free air.  Osseous structures   demonstrate degenerative changes.           Impression   No acute findings.                                                PULMONARY FUNCTION     PATIENT NAME: Raj Jama                  :        1970  MED REC NO:   [id number]                             ROOM:  ACCOUNT NO:   [de-identified]                           ADMIT DATE: 10/13/2020  PROVIDER:     [employer]     DATE OF PROCEDURE:  10/13/2020     INTERPRETATION:  The patient's spirometry shows obstructive flow volume  loop of severe nature. FEV1 is 35% predicted. FVC is 50% predicted. There was no positive bronchodilator change. FEV1/FVC ratio is 56%.    Lung volumes by body box, total lung capacity is 93% predicted, %  predicted, and diffusion capacity uncorrected 68% predicted, corrected  for alveolar volume 101% predicted. Airway resistance is normal.     FINAL IMPRESSION:  This study shows severe stage III COPD without  positive bronchodilator change. There is air trapping and there is  moderately decreased diffusion capacity, which could be due to  underlying emphysema. Clinical correlation advised.          IMPRESSION:     Diagnosis Orders   1. Stage 3 severe COPD by GOLD classification ([address]     2. Centrilobular emphysema (HCC)     3. Current smoker          :                PLAN:      Advised smoking cessation   Cont spiriva , symbicort   Follow up 6 months     Requested Prescriptions      No prescriptions requested or ordered in this encounter       Medications Discontinued During This Encounter   Medication Reason    nicotine polacrilex (COMMIT) 4 MG lozenge Patient Choice    nicotine (NICODERM CQ) 21 MG/24HR Patient Choice       Magdiel Hood received counseling on the following healthy behaviors: nutrition, exercise and medication adherence    Patient given educational materials : see patient instruction       Discussed use, benefit, and side effects of prescribed medications. Barriers to medication compliance addressed. All patient questions answered. Pt voiced understanding. I hope this updates you on my evaluation and clinical thinking. Thank you for allowing me to participate in his care. Sincerely,      Electronically signed by Magi Jeter MD on 5/28/2021 at 9:04 PM       Please note that this chart was generated using voice recognition Dragon dictation software. Although every effort was made to ensure the accuracy of this automated transcription, some errors in transcription may have occurred.

## 2021-07-21 ENCOUNTER — OFFICE VISIT (OUTPATIENT)
Dept: PAIN MANAGEMENT | Age: 51
End: 2021-07-21
Payer: COMMERCIAL

## 2021-07-21 VITALS
SYSTOLIC BLOOD PRESSURE: 132 MMHG | HEIGHT: 65 IN | DIASTOLIC BLOOD PRESSURE: 80 MMHG | BODY MASS INDEX: 34.82 KG/M2 | WEIGHT: 209 LBS

## 2021-07-21 DIAGNOSIS — M47.817 LUMBOSACRAL SPONDYLOSIS WITHOUT MYELOPATHY: ICD-10-CM

## 2021-07-21 DIAGNOSIS — G62.9 PERIPHERAL POLYNEUROPATHY: ICD-10-CM

## 2021-07-21 DIAGNOSIS — M25.551 RIGHT HIP PAIN: ICD-10-CM

## 2021-07-21 DIAGNOSIS — M50.30 DDD (DEGENERATIVE DISC DISEASE), CERVICAL: Primary | ICD-10-CM

## 2021-07-21 DIAGNOSIS — M51.36 DDD (DEGENERATIVE DISC DISEASE), LUMBAR: ICD-10-CM

## 2021-07-21 DIAGNOSIS — M54.16 LUMBAR RADICULOPATHY: ICD-10-CM

## 2021-07-21 PROCEDURE — 99214 OFFICE O/P EST MOD 30 MIN: CPT | Performed by: NURSE PRACTITIONER

## 2021-07-21 PROCEDURE — G8417 CALC BMI ABV UP PARAM F/U: HCPCS | Performed by: NURSE PRACTITIONER

## 2021-07-21 PROCEDURE — 4004F PT TOBACCO SCREEN RCVD TLK: CPT | Performed by: NURSE PRACTITIONER

## 2021-07-21 PROCEDURE — 3017F COLORECTAL CA SCREEN DOC REV: CPT | Performed by: NURSE PRACTITIONER

## 2021-07-21 PROCEDURE — G8427 DOCREV CUR MEDS BY ELIG CLIN: HCPCS | Performed by: NURSE PRACTITIONER

## 2021-07-21 RX ORDER — ATORVASTATIN CALCIUM 80 MG/1
80 TABLET, FILM COATED ORAL DAILY
COMMUNITY
Start: 2021-06-10 | End: 2022-07-28

## 2021-07-21 RX ORDER — CELECOXIB 200 MG/1
200 CAPSULE ORAL 2 TIMES DAILY
Qty: 60 CAPSULE | Refills: 3 | Status: SHIPPED | OUTPATIENT
Start: 2021-07-21 | End: 2022-03-22

## 2021-07-21 RX ORDER — CYCLOBENZAPRINE HCL 10 MG
10 TABLET ORAL 3 TIMES DAILY PRN
Qty: 60 TABLET | Refills: 5 | Status: SHIPPED | OUTPATIENT
Start: 2021-07-21 | End: 2022-04-15

## 2021-07-21 RX ORDER — GABAPENTIN 300 MG/1
CAPSULE ORAL
Qty: 180 CAPSULE | Refills: 5 | Status: SHIPPED | OUTPATIENT
Start: 2021-07-21 | End: 2022-07-28

## 2021-07-21 ASSESSMENT — ENCOUNTER SYMPTOMS
SORE THROAT: 0
SHORTNESS OF BREATH: 1
CONSTIPATION: 0
BACK PAIN: 1

## 2021-07-21 NOTE — PROGRESS NOTES
Subjective:      Patient ID: Matthias Barragan is a 45 y.o. female. Chief Complaint   Patient presents with    Follow-up     2 month f/u - right hip, neck and back pain. HPI Follow up after initial new patient consult. History lumbar and cervical surgeries, right hip pain. Prescribed meloxicam, gabapentin and flexeril. Muscle cramps-started on magnesium last visit. Medications are providing her with some relief. Denies side effects. Started PT since last visit, currently does not have a vehicle, so needs to restart once she gets her car back. Previous pain management patient approximately 5 years ago in [facility], has surgery and didn't feel the need to return.      Pain Assessment  Location of Pain: Back (neck and right hip)  Location Modifiers: Inferior, Left  Severity of Pain: 8  Quality of Pain: Grinding, Aching, Sharp  Duration of Pain: Persistent  Frequency of Pain: Constant  Aggravating Factors: Stairs, Walking  Limiting Behavior: Yes  Relieving Factors: Exercise    Allergies   Allergen Reactions    Methyldopa Anaphylaxis       Outpatient Medications Marked as Taking for the 7/21/21 encounter (Office Visit) with KEIRY Turcios - CNP   Medication Sig Dispense Refill    atorvastatin (LIPITOR) 80 MG tablet Take 80 mg by mouth daily      celecoxib (CELEBREX) 200 MG capsule Take 1 capsule by mouth 2 times daily 60 capsule 3    cyclobenzaprine (FLEXERIL) 10 MG tablet Take 1 tablet by mouth 3 times daily as needed for Muscle spasms 60 tablet 5    gabapentin (NEURONTIN) 300 MG capsule TAKE 1-2 CAPSULEs BY MOUTH THREE TIMES DAILY 180 capsule 5    busPIRone (BUSPAR) 5 MG tablet TAKE ONE TABLET TWICE DAILY FOR ANXIETY      magnesium oxide (MAG-OX) 400 MG tablet Take 1 tablet by mouth 2 times daily 60 tablet 5    budesonide-formoterol (SYMBICORT) 80-4.5 MCG/ACT AERO Inhale 2 puffs into the lungs 2 times daily 1 Inhaler 6    tiotropium (SPIRIVA RESPIMAT) 2.5 MCG/ACT AERS inhaler Inhale 2 puffs Last Year:    Transportation Needs:     Lack of Transportation (Medical):  Lack of Transportation (Non-Medical):    Physical Activity:     Days of Exercise per Week:     Minutes of Exercise per Session:    Stress:     Feeling of Stress :    Social Connections:     Frequency of Communication with Friends and Family:     Frequency of Social Gatherings with Friends and Family:     Attends Christianity Services:     Active Member of Clubs or Organizations:     Attends Club or Organization Meetings:     Marital Status:    Intimate Partner Violence:     Fear of Current or Ex-Partner:     Emotionally Abused:     Physically Abused:     Sexually Abused:      Review of Systems   Constitutional: Negative for fever. HENT: Negative for sore throat. Respiratory: Positive for shortness of breath (history COPD). Cardiovascular: Positive for chest pain (intermittent, follows cardiology). Gastrointestinal: Negative for constipation. History IBS, colitis   Genitourinary:        Intermittent incontinence     Musculoskeletal: Positive for arthralgias, back pain and myalgias. Neurological: Positive for dizziness. Psychiatric/Behavioral: Positive for sleep disturbance. Objective:   Physical Exam  Vitals reviewed. Constitutional:       General: She is not in acute distress. Appearance: Normal appearance. She is well-developed and overweight. She is not ill-appearing, toxic-appearing or diaphoretic. Interventions: She is not intubated. HENT:      Head: Normocephalic and atraumatic. Right Ear: External ear normal.      Left Ear: External ear normal.      Nose: Nose normal.      Mouth/Throat:      Lips: Pink. Mouth: Mucous membranes are moist.   Eyes:      General: Lids are normal.   Cardiovascular:      Rate and Rhythm: Normal rate.    Pulmonary:      Effort: Pulmonary effort is normal. No tachypnea, bradypnea, accessory muscle usage, prolonged expiration, respiratory distress or retractions. She is not intubated. Abdominal:      General: Abdomen is flat. Musculoskeletal:      Right hip: Decreased range of motion. Right foot: No Charcot foot or foot drop. Left foot: No Charcot foot or foot drop. Skin:     General: Skin is warm and dry. Capillary Refill: Capillary refill takes less than 2 seconds. Coloration: Skin is not ashen or jaundiced. Findings: No rash. Nails: There is no clubbing. Neurological:      Mental Status: She is alert and oriented to person, place, and time. GCS: GCS eye subscore is 4. GCS verbal subscore is 5. GCS motor subscore is 6. Cranial Nerves: No cranial nerve deficit. Psychiatric:         Speech: Speech normal.         Behavior: Behavior is cooperative. Assessment / Plan:      Diagnosis Orders   1. DDD (degenerative disc disease), cervical     2. Peripheral polyneuropathy  gabapentin (NEURONTIN) 300 MG capsule   3. Lumbosacral spondylosis without myelopathy     4. DDD (degenerative disc disease), lumbar     5. Lumbar radiculopathy     6.  Right hip pain          Increase Flexeril tid prn  Increase gabapentin 300-600 tid   Failed meloxicam and diclofenac, start celebrex 200mg bid  Resume PT  Follow up one month

## 2021-09-01 ENCOUNTER — OFFICE VISIT (OUTPATIENT)
Dept: PULMONOLOGY | Age: 51
End: 2021-09-01
Payer: COMMERCIAL

## 2021-09-01 ENCOUNTER — HOSPITAL ENCOUNTER (OUTPATIENT)
Dept: GENERAL RADIOLOGY | Age: 51
Discharge: HOME | End: 2021-09-03
Payer: COMMERCIAL

## 2021-09-01 VITALS
OXYGEN SATURATION: 94 % | DIASTOLIC BLOOD PRESSURE: 80 MMHG | HEIGHT: 65 IN | TEMPERATURE: 97 F | BODY MASS INDEX: 34.52 KG/M2 | WEIGHT: 207.2 LBS | HEART RATE: 84 BPM | SYSTOLIC BLOOD PRESSURE: 132 MMHG

## 2021-09-01 DIAGNOSIS — J96.11 CHRONIC RESPIRATORY FAILURE WITH HYPOXIA (HCC): ICD-10-CM

## 2021-09-01 DIAGNOSIS — F17.200 CURRENT SMOKER: ICD-10-CM

## 2021-09-01 DIAGNOSIS — R07.89 LEFT-SIDED CHEST WALL PAIN: ICD-10-CM

## 2021-09-01 DIAGNOSIS — J43.2 CENTRILOBULAR EMPHYSEMA (HCC): ICD-10-CM

## 2021-09-01 DIAGNOSIS — J44.9 STAGE 3 SEVERE COPD BY GOLD CLASSIFICATION (HCC): Primary | ICD-10-CM

## 2021-09-01 PROCEDURE — 4004F PT TOBACCO SCREEN RCVD TLK: CPT | Performed by: INTERNAL MEDICINE

## 2021-09-01 PROCEDURE — G8417 CALC BMI ABV UP PARAM F/U: HCPCS | Performed by: INTERNAL MEDICINE

## 2021-09-01 PROCEDURE — 99213 OFFICE O/P EST LOW 20 MIN: CPT | Performed by: INTERNAL MEDICINE

## 2021-09-01 PROCEDURE — G8427 DOCREV CUR MEDS BY ELIG CLIN: HCPCS | Performed by: INTERNAL MEDICINE

## 2021-09-01 PROCEDURE — 71101 X-RAY EXAM UNILAT RIBS/CHEST: CPT

## 2021-09-01 PROCEDURE — 3023F SPIROM DOC REV: CPT | Performed by: INTERNAL MEDICINE

## 2021-09-01 PROCEDURE — 99214 OFFICE O/P EST MOD 30 MIN: CPT | Performed by: INTERNAL MEDICINE

## 2021-09-01 PROCEDURE — 3017F COLORECTAL CA SCREEN DOC REV: CPT | Performed by: INTERNAL MEDICINE

## 2021-09-01 PROCEDURE — G8926 SPIRO NO PERF OR DOC: HCPCS | Performed by: INTERNAL MEDICINE

## 2021-09-01 NOTE — PROGRESS NOTES
REASON FOR THE CONSULTATION:  Shortness of breath  HISTORY OF PRESENT ILLNESS:    Maria E Jensen is a 45y.o. year old female   Since last visit no COPD exacerbation not requiring steroids or antibiotics. Unfortunately continues to smoke. She is compliant with her treatment therapy with inhalers and oxygen. About 3 days ago she was busy with her mother's  and fell down on her left chest and that has been hurting her with deep breathing and coughing. She does have chronic sputum production due to smoking without any purulence    Previous visit  Patient is doing well shortness of breath is stable with exertion   she did not have any hospital visits for COPD exacerbation or need steroids. Sputum is nonpurulent no hemoptysis   she is compliant with her inhalers. Last visit    here for evaluation of shortness of breath. Patient has been short of breath for few years but it has slowly been progressing. Patient gets short of breath walking short distances and at times she also wheezes. She has a lot of sputum throughout the day but no hemoptysis sputum is clear without any purulence. Patient's now smoking 1 pack/day but she was up to 4 packs/day for 5 years and she has smoked for total 40 years on average 2 packs/day. In 2019 she had cavitary pneumonia of the right lung and was treated at [facility]. Patient does not do drugs or have animals or birds at home. 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 She is not on home oxygen          LUNG CANCER SCREENING     1. CRITERIA MET    []     CT ORDERED  []      2. CRITERIA NOT MET   [x]      3. REFUSED                    []        REASON CRITERIA NOT MET     1. SMOKING LESS THAN 30 PY  []      2. AGE LESS THAN 55 or GREATER 77 YEARS  []      3. QUIT SMOKING 15 YEARS OR GREATER   []      4. RECENT CT WITH IN 11 MONTHS    []      5. LIFE EXPECTANCY < 5 YEARS   []      6.  SIGNS  AND SYMPTOMS OF LUNG CANCER   []         Immunization Immunization History   Administered Date(s) Administered    COVID-19, Pfizer, PF, 30mcg/0.3mL 05/15/2021, 06/05/2021    Influenza Virus Vaccine 02/12/2020, 02/12/2020, 11/10/2020    PPD Test 06/08/2019    Pneumococcal Polysaccharide ([id number]) 06/13/2019        Pneumococcal Vaccine     [] Up to date    [] Indicated   [] Refused  [] Contraindicated       Influenza Vaccine   [] Up to date    [] Indicated   [] Refused  [] Contraindicated        Pulmonary Rehab   [] Completed   [] Indicated   [] Refused  [] Contraindicated   PAST MEDICAL HISTORY:       Diagnosis Date    Arthritis     COPD (chronic obstructive pulmonary disease) [address]     Depression     Hyperlipidemia     Hypertension          Family History:   History reviewed. No pertinent family history. SURGICAL HISTORY:   Past Surgical History:   Procedure Laterality Date    BACK SURGERY      TUBAL LIGATION             SOCIAL AND OCCUPATIONAL HEALTH:      There  No history of TB or TB exposure. There  No asbestos ,[employer] dust exposure. The patient reports  No coal mine, [place], [place], [facility] exposure. History of travel outside the country No  There  is use of   marijuana No   VapingNo  IV heroinNo  Crack cocaineNo  There  Nohot tub exposure. Pets -cats No, dogsNo  Birds No    Occupational history kitchen    TOBACCO:   reports that she has been smoking cigarettes. She has a 40.00 pack-year smoking history. She has never used smokeless tobacco.  ETOH:   reports previous alcohol use. ALLERGIES:      Allergies   Allergen Reactions    Methyldopa Anaphylaxis         Home Meds:   Prior to Admission medications    Medication Sig Start Date End Date Taking?  Authorizing Provider   atorvastatin (LIPITOR) 80 MG tablet Take 80 mg by mouth daily 6/10/21 9/8/21 Yes Historical Provider, MD   cyclobenzaprine (FLEXERIL) 10 MG tablet Take 1 tablet by mouth 3 times daily as needed for Muscle spasms 7/21/21  Yes KEIRY Mackenzie - CNP   gabapentin (NEURONTIN) 300 MG capsule TAKE 1-2 CAPSULEs BY MOUTH THREE TIMES DAILY 7/21/21 12/15/21 Yes KEIRY Moser CNP   busPIRone (BUSPAR) 5 MG tablet TAKE ONE TABLET TWICE DAILY FOR ANXIETY 5/21/21  Yes Historical Provider, MD   magnesium oxide (MAG-OX) 400 MG tablet Take 1 tablet by mouth 2 times daily 5/14/21  Yes KEIRY Moser CNP   budesonide-formoterol (SYMBICORT) 80-4.5 MCG/ACT AERO Inhale 2 puffs into the lungs 2 times daily 3/30/21 9/1/21 Yes Kiki Brower MD   tiotropium (SPIRIVA RESPIMAT) 2.5 MCG/ACT AERS inhaler Inhale 2 puffs into the lungs daily 3/30/21 9/1/21 Yes Kiki Brower MD   FLUoxetine (PROZAC) 40 MG capsule TAKE ONE CAPSULE BY MOUTH ONCE DAILY 3/29/21  Yes Gregoria Arenas MD   traZODone (DESYREL) 100 MG tablet TAKE ONE TABLET BY MOUTH ONCE DAILY AT BEDTIME 3/29/21  Yes Gregoria Arenas MD   loratadine (CLARITIN) 10 MG tablet Take 10 mg by mouth daily   Yes Historical Provider, MD   amLODIPine (NORVASC) 10 MG tablet Take 10 mg by mouth daily   Yes Historical Provider, MD   fexofenadine (ALLEGRA) 60 MG tablet Take 60 mg by mouth daily   Yes Historical Provider, MD   pantoprazole (PROTONIX) 40 MG tablet Take 40 mg by mouth daily   Yes Historical Provider, MD   albuterol (PROVENTIL) (2.5 MG/3ML) 0.083% nebulizer solution Take 2.5 mg by nebulization every 6 hours as needed for Wheezing   Yes Historical Provider, MD   albuterol sulfate HFA (VENTOLIN HFA) 108 (90 Base) MCG/ACT inhaler Inhale 2 puffs into the lungs every 6 hours as needed for Wheezing   Yes Historical Provider, MD   celecoxib (CELEBREX) 200 MG capsule Take 1 capsule by mouth 2 times daily  Patient not taking: Reported on 9/1/2021 7/21/21   KEIRY Moser CNP   atorvastatin (LIPITOR) 40 MG tablet Take 40 mg by mouth daily  Patient not taking: Reported on 9/1/2021    Historical Provider, MD            Review of Systems -  General ROS: negative for - chills, fatigue, fever or weight loss  ENT ROS: negative for - headaches, oral lesions or sore throat  Cardiovascular ROS: no chest pain , orthopnea or pnd   Gastrointestinal ROS: no abdominal pain, change in bowel habits, or black or bloody stools  Skin - no rash   Neuro - no blurry vision , no loc . No focal weakness   msk -pain on the left axillary region on deep breathing and coughing  Vascular - no claudication , rest completed and negative   Lymphatic - complete and negative   Hematology - oncology - complete and negative   Allergy immunology - complete and negative    no burning or hematuria    Vitals:  /80   Pulse 84   Temp 97 °F (36.1 °C)   Ht 5' 5\" (1.651 m)   Wt 207 lb 3.2 oz (94 kg)   SpO2 94%   BMI 34.48 kg/m²     PHYSICAL EXAM:  Head and neck atraumatic, normocephalic    Lymph nodes-no cervical, supraclavicular lymphadenopathy    Neck-no JVP elevation    Lungs - AP diameter of chest increased. Thoracic expansion and diaphragmatic excursion diminished. BS diminished and expiratory phase prolonged. No dullness to percussion or tenderness to palpation. No bronchial breath sounds . Tenderness of the left axillary muscles  CVS- S1, S2 regular. No S3 no S4, no murmurs    Abdomen-nontender, nondistended. Bowel sounds are present. No organomegaly    Lower extremity-no edema    Upper extremity-no edema    Neurological-grossly normal cranial nerves.   No overt motor deficit      Reason for Exam: History of COPD, cough, and smoking 0.5 to 1ppd for 40 years   Acuity: Chronic   Type of Exam: Initial       FINDINGS:   Heart appears normal in size.  Mild lung scarring.  No focal consolidation,   pneumothorax or pleural effusion.  No free air.  Osseous structures   demonstrate degenerative changes.           Impression   No acute findings.                                                PULMONARY FUNCTION     PATIENT NAME: Fern Schafer                  :        1970  MED REC NO:   [id number] ROOM:  ACCOUNT NO:   [de-identified]                           ADMIT DATE: 10/13/2020  PROVIDER:     Rita Lynch     DATE OF PROCEDURE:  10/13/2020     INTERPRETATION:  The patient's spirometry shows obstructive flow volume  loop of severe nature. FEV1 is 35% predicted. FVC is 50% predicted. There was no positive bronchodilator change. FEV1/FVC ratio is 56%. Lung volumes by body box, total lung capacity is 93% predicted, %  predicted, and diffusion capacity uncorrected 68% predicted, corrected  for alveolar volume 101% predicted. Airway resistance is normal.     FINAL IMPRESSION:  This study shows severe stage III COPD without  positive bronchodilator change. There is air trapping and there is  moderately decreased diffusion capacity, which could be due to  underlying emphysema. Clinical correlation advised.          IMPRESSION:     Diagnosis Orders   1. Stage 3 severe COPD by GOLD classification ([address]     2. Chronic respiratory failure with hypoxia (HCC)     3. Current smoker     4. Centrilobular emphysema ([address]     5. Left-sided chest wall pain  XR RIBS LEFT INCLUDE CHEST (MIN 3 VIEWS)        :                PLAN:      Patient fell 3 days ago on the left chest and has been having discomfort there is no skin bruising. Will obtain x-ray of the ribs and the chest to rule out rib fracture. Advised pain control with Tylenol or Motrin. Advised smoking cessation  Continue Spiriva and Symbicort  Continue supplemental oxygen as prescribed  Use albuterol as needed  Follow-up 6 months    Requested Prescriptions      No prescriptions requested or ordered in this encounter       There are no discontinued medications. Sang received counseling on the following healthy behaviors: nutrition, exercise and medication adherence    Patient given educational materials : see patient instruction       Discussed use, benefit, and side effects of prescribed medications. Barriers to medication compliance addressed. All patient questions answered. Pt voiced understanding. I hope this updates you on my evaluation and clinical thinking. Thank you for allowing me to participate in his care. Sincerely,      Electronically signed by Alicia Damon MD on 9/1/2021 at 2:36 PM       Please note that this chart was generated using voice recognition Dragon dictation software. Although every effort was made to ensure the accuracy of this automated transcription, some errors in transcription may have occurred.

## 2021-11-19 DIAGNOSIS — M25.551 RIGHT HIP PAIN: ICD-10-CM

## 2021-11-19 DIAGNOSIS — M54.2 CERVICALGIA: ICD-10-CM

## 2021-11-23 RX ORDER — DICLOFENAC SODIUM 75 MG/1
TABLET, DELAYED RELEASE ORAL
Qty: 60 TABLET | Refills: 3 | Status: SHIPPED | OUTPATIENT
Start: 2021-11-23 | End: 2022-03-15

## 2021-11-23 NOTE — TELEPHONE ENCOUNTER
Last Appt:  7/21/2021  Next Appt:   Visit date not found  Med verified in [address] is requesting diclofenac and magnesium oxide if appropriate please sign the order

## 2022-02-10 DIAGNOSIS — G62.9 PERIPHERAL POLYNEUROPATHY: ICD-10-CM

## 2022-02-10 RX ORDER — GABAPENTIN 300 MG/1
CAPSULE ORAL
Qty: 180 CAPSULE | Refills: 5 | OUTPATIENT
Start: 2022-02-10

## 2022-03-02 ENCOUNTER — TELEPHONE (OUTPATIENT)
Dept: PULMONOLOGY | Age: 52
End: 2022-03-02

## 2022-03-02 NOTE — TELEPHONE ENCOUNTER
Writer phoned pt to r/s appointment due to provider being out of office. Phone number was a non working number. L/m on husbands voicemail for return call.  Will mail letter

## 2022-03-09 NOTE — PROGRESS NOTES
Subjective:      Patient ID: Federico Miller is a 45 y.o. female. HPI  Patient is here for follow-up for COPD. Patient was last seen in the office in September 2021 per Dr. Allyssa Perdue. Patient is unfortunately still smoking, currently smoking 1/2 pack to 1 pack/day. Previously was smoking 3 to 4 packs/day. Having difficulty with smoking cessation. Not really motivated to quit. She is reporting significant decline in her exercise tolerance and increasing her exertional dyspnea. She reports that she has a cough that is generally very productive of dark yellow to dark green secretions. She denies any specific hemoptysis. Patient is on supplemental oxygen at 2 L/min nocturnally that she has been using quite frequently during the day. She reports that her weight fluctuates between 20 pounds up and down. She reports a history of a cavitary pneumonia in 2019. And is describing that her chest feels heavy. Due to her subjective reported decline in her overall symptoms and her extensive smoking history we will go ahead and CT her chest to evaluate for any lung pathology that may be contributing to her symptoms. Patient had been on [address] [employer] and felt that it was better for her breathing unfortunately it is not covered by her insurance. She is on Symbicort and Spiriva. Her exertional dyspnea is impacting her work and she is now down to being able to work only 2 days/week. She is reporting that she has a case on disability in progress. Medications:   Symbicort 160-4.5 mcg:  Spiriva Respimat 2.5 mcg: Albuterol neb- 2-3 x daily  Albuterol HFA  Oxygen 2 LPM at night and using during the day. PRIOR WORKUP:  PFT:  PFT 10/13/2020: Spirometry showing obstructive flow volume loop of severe nature. FEV1 35% of predicted. FVC 50% of predicted. No positive bronchodilator change. FEV1/FVC ratio is 56%.   Lung volumes by body box show total lung capacity 93% of predicted, % of predicted and diffusion capacity when corrected for alveolar volume 101% of predicted. Final impression: Stage III COPD without positive bronchodilator change. Air trapping and moderately decreased diffusion capacity could be due to underlying emphysema. CT Imaging:  None on chart    Sleep Study:    Laboratory Evaluation:    Immunizations:   Immunization History   Administered Date(s) Administered    COVID-19, Pfizer Purple top, DILUTE for use, 12+ yrs, 30mcg/0.3mL dose 05/15/2021, 06/05/2021    Influenza Virus Vaccine 02/12/2020, 02/12/2020, 11/10/2020    PPD Test 06/08/2019    Pneumococcal Polysaccharide ([id number]) 06/13/2019        No flowsheet data found. /78 (Site: Left Upper Arm, Position: Sitting, Cuff Size: Large Adult)   Pulse 80   Ht 5' 5\" (1.651 m)   Wt 206 lb (93.4 kg)   SpO2 95%   BMI 34.28 kg/m²     Past Medical History:   Diagnosis Date    Arthritis     COPD (chronic obstructive pulmonary disease) (HCC)     Depression     Hyperlipidemia     Hypertension      Past Surgical History:   Procedure Laterality Date    BACK SURGERY      TUBAL LIGATION       No family history on file.     Social History     Socioeconomic History    Marital status: Single     Spouse name: Not on file    Number of children: Not on file    Years of education: Not on file    Highest education level: Not on file   Occupational History    Not on file   Tobacco Use    Smoking status: Current Every Day Smoker     Packs/day: 1.00     Years: 40.00     Pack years: 40.00     Types: Cigarettes    Smokeless tobacco: Never Used   Substance and Sexual Activity    Alcohol use: Not Currently     Comment: rare    Drug use: Never    Sexual activity: Not on file   Other Topics Concern    Not on file   Social History Narrative    Not on file     Social Determinants of Health     Financial Resource Strain:     Difficulty of Paying Living Expenses: Not on file   Food Insecurity:     Worried About Running Out of Food in the Last Year: Not on file    Ran Out of Food in the Last Year: Not on file   Transportation Needs:     Lack of Transportation (Medical): Not on file    Lack of Transportation (Non-Medical): Not on file   Physical Activity:     Days of Exercise per Week: Not on file    Minutes of Exercise per Session: Not on file   Stress:     Feeling of Stress : Not on file   Social Connections:     Frequency of Communication with Friends and Family: Not on file    Frequency of Social Gatherings with Friends and Family: Not on file    Attends [employer] Services: Not on file    Active Member of [address] [place] [address] [employer] or Organizations: Not on file    Attends Club or Organization Meetings: Not on file    Marital Status: Not on file   Intimate Partner Violence:     Fear of Current or Ex-Partner: Not on file    Emotionally Abused: Not on file    Physically Abused: Not on file    Sexually Abused: Not on file   Housing Stability:     Unable to Pay for Housing in the Last Year: Not on file    Number of [place] in the Last Year: Not on file    Unstable Housing in the Last Year: Not on file       Review of Systems   Constitutional:        Patient reports a dramatic decline in her physical endurance and conditioning, limited by her exertional work of breathing. HENT: Negative. Eyes: Negative. Respiratory:        Significant exertional dyspnea, cough productive of dark yellow/green secretions. Cardiovascular: Negative. Gastrointestinal: Negative. Endocrine: Negative. Genitourinary: Negative. Musculoskeletal: Negative. Skin: Negative. Allergic/Immunologic: Negative. Neurological: Negative. Hematological: Negative. Psychiatric/Behavioral: Negative.         Objective:       Physical Exam  General appearance - alert, well appearing, and in no distress, oriented to person, place, and time and overweight  Mental status - alert, oriented to person, place, and time, normal mood, behavior, speech, dress, motor activity, and thought processes  Eyes - pupils equal and reactive, extraocular eye movements intact  Ears - not examined  Nose - normal and patent, no erythema, discharge or polyps  Mouth - mucous membranes moist, pharynx normal without lesions  Neck - supple, no significant adenopathy  Chest -increased AP diameter, decreased thoracic expansion and excursion, prolonged expiratory phase. No adventitious lung sounds appreciated. Lungs are generally diminished throughout with minimal air movement. Heart -normal rate, regular rhythm, normal S1, S2, no murmurs, rubs, clicks or gallops  Abdomen - soft, nontender, nondistended, no masses or organomegaly  Neuro- alert, oriented, normal speech, no focal findings or movement disorder noted}  Extremities - peripheral pulses normal, no pedal edema, no clubbing or cyanosis  Skin - normal coloration and turgor, no rashes, no suspicious skin lesions noted     Wt Readings from Last 3 Encounters:   03/22/22 206 lb (93.4 kg)   09/01/21 207 lb 3.2 oz (94 kg)   07/21/21 209 lb (94.8 kg)       Results for orders placed or performed during the hospital encounter of 10/08/20   COVID-19 Ambulatory    Specimen: Nasopharyngeal Swab   Result Value Ref Range    SARS-CoV-2, PRUDENCIO Not Detected Not Detected       No results found. Assessment:      1. Stage 3 severe COPD by GOLD classification ([address]    2. Current smoker    3. Chronic respiratory failure with hypoxia (HCC)    4. Centrilobular emphysema ([address]    5. Cavitary pneumonia    6. Smoker unmotivated to quit          Plan:      1. Medications reviewed, continue Symbicort and Spiriva. Refill sent to pharmacy. 2. Educated and clarified the medication use. 3. Recommend flu vaccination in the fall annually. 4. Patient is up-to-date with pneumococcal vaccinations from pulmonary perspective. 5. Patient has COVID vaccines, recommended booster however patient is reluctant.   Discussed strategies to protect against Covid 19.   6. Maintain an active lifestyle. 7. Patient's questions were answered to her satisfaction. 8. Supplemental oxygen continued as ordered, patient is using 2 L/min at night and has been using pretty significantly throughout the day. She does report that she has portable O2   9. Pulmonary function tests were reviewed  10. CT scan of the chest   11. We'll see the patient back in 4 months after CT chest completed.           Electronically signed by KEIRY Clements CNP on 3/22/2022 at 12:46 PM

## 2022-03-14 DIAGNOSIS — M54.2 CERVICALGIA: ICD-10-CM

## 2022-03-14 DIAGNOSIS — M25.551 RIGHT HIP PAIN: ICD-10-CM

## 2022-03-15 RX ORDER — DICLOFENAC SODIUM 75 MG/1
TABLET, DELAYED RELEASE ORAL
Qty: 60 TABLET | Refills: 3 | Status: SHIPPED | OUTPATIENT
Start: 2022-03-15 | End: 2022-07-28

## 2022-03-22 ENCOUNTER — OFFICE VISIT (OUTPATIENT)
Dept: PULMONOLOGY | Age: 52
End: 2022-03-22
Payer: COMMERCIAL

## 2022-03-22 VITALS
OXYGEN SATURATION: 95 % | SYSTOLIC BLOOD PRESSURE: 120 MMHG | WEIGHT: 206 LBS | HEIGHT: 65 IN | HEART RATE: 80 BPM | DIASTOLIC BLOOD PRESSURE: 78 MMHG | BODY MASS INDEX: 34.32 KG/M2

## 2022-03-22 DIAGNOSIS — F17.200 SMOKER UNMOTIVATED TO QUIT: ICD-10-CM

## 2022-03-22 DIAGNOSIS — F17.200 CURRENT SMOKER: ICD-10-CM

## 2022-03-22 DIAGNOSIS — J98.4 CAVITARY PNEUMONIA: ICD-10-CM

## 2022-03-22 DIAGNOSIS — J43.2 CENTRILOBULAR EMPHYSEMA (HCC): ICD-10-CM

## 2022-03-22 DIAGNOSIS — J18.9 CAVITARY PNEUMONIA: ICD-10-CM

## 2022-03-22 DIAGNOSIS — J96.11 CHRONIC RESPIRATORY FAILURE WITH HYPOXIA (HCC): ICD-10-CM

## 2022-03-22 DIAGNOSIS — J44.9 STAGE 3 SEVERE COPD BY GOLD CLASSIFICATION (HCC): Primary | ICD-10-CM

## 2022-03-22 PROCEDURE — 99214 OFFICE O/P EST MOD 30 MIN: CPT | Performed by: NURSE PRACTITIONER

## 2022-03-22 PROCEDURE — G8427 DOCREV CUR MEDS BY ELIG CLIN: HCPCS | Performed by: NURSE PRACTITIONER

## 2022-03-22 PROCEDURE — 3017F COLORECTAL CA SCREEN DOC REV: CPT | Performed by: NURSE PRACTITIONER

## 2022-03-22 PROCEDURE — G8484 FLU IMMUNIZE NO ADMIN: HCPCS | Performed by: NURSE PRACTITIONER

## 2022-03-22 PROCEDURE — 4004F PT TOBACCO SCREEN RCVD TLK: CPT | Performed by: NURSE PRACTITIONER

## 2022-03-22 PROCEDURE — G8417 CALC BMI ABV UP PARAM F/U: HCPCS | Performed by: NURSE PRACTITIONER

## 2022-03-22 PROCEDURE — 3023F SPIROM DOC REV: CPT | Performed by: NURSE PRACTITIONER

## 2022-03-22 RX ORDER — PREDNISONE 10 MG/1
TABLET ORAL
Qty: 30 TABLET | Refills: 0 | Status: SHIPPED | OUTPATIENT
Start: 2022-03-22

## 2022-03-22 RX ORDER — ALBUTEROL SULFATE 2.5 MG/3ML
2.5 SOLUTION RESPIRATORY (INHALATION) EVERY 6 HOURS PRN
Qty: 360 EACH | Refills: 3 | Status: SHIPPED | OUTPATIENT
Start: 2022-03-22

## 2022-03-22 RX ORDER — BUDESONIDE AND FORMOTEROL FUMARATE DIHYDRATE 160; 4.5 UG/1; UG/1
2 AEROSOL RESPIRATORY (INHALATION) 2 TIMES DAILY
Qty: 3 EACH | Refills: 3 | Status: SHIPPED | OUTPATIENT
Start: 2022-03-22

## 2022-03-22 RX ORDER — ALBUTEROL SULFATE 90 UG/1
2 AEROSOL, METERED RESPIRATORY (INHALATION) EVERY 6 HOURS PRN
Qty: 3 EACH | Refills: 3 | Status: SHIPPED | OUTPATIENT
Start: 2022-03-22

## 2022-03-22 RX ORDER — AMOXICILLIN AND CLAVULANATE POTASSIUM 875; 125 MG/1; MG/1
1 TABLET, FILM COATED ORAL 2 TIMES DAILY
Qty: 14 TABLET | Refills: 0 | Status: SHIPPED | OUTPATIENT
Start: 2022-03-22 | End: 2022-03-29

## 2022-03-22 ASSESSMENT — ENCOUNTER SYMPTOMS
ALLERGIC/IMMUNOLOGIC NEGATIVE: 1
EYES NEGATIVE: 1
GASTROINTESTINAL NEGATIVE: 1

## 2022-04-15 RX ORDER — CYCLOBENZAPRINE HCL 10 MG
TABLET ORAL
Qty: 60 TABLET | Refills: 5 | Status: SHIPPED | OUTPATIENT
Start: 2022-04-15

## 2022-04-15 NOTE — TELEPHONE ENCOUNTER
Patient called refill line for their  Cyclobenzaprine 10 mg 60 Tabs  Send to  [facility] in [address] [place] [address]    Last Appt:  7/21/2021  Next Appt:  No upcoming apt   Med verified in Epic

## 2022-04-19 ENCOUNTER — HOSPITAL ENCOUNTER (OUTPATIENT)
Dept: CT IMAGING | Age: 52
Discharge: HOME | End: 2022-04-21
Payer: COMMERCIAL

## 2022-04-19 DIAGNOSIS — J98.4 CAVITARY PNEUMONIA: ICD-10-CM

## 2022-04-19 DIAGNOSIS — J43.2 CENTRILOBULAR EMPHYSEMA (HCC): ICD-10-CM

## 2022-04-19 DIAGNOSIS — F17.200 SMOKER UNMOTIVATED TO QUIT: ICD-10-CM

## 2022-04-19 DIAGNOSIS — J18.9 CAVITARY PNEUMONIA: ICD-10-CM

## 2022-04-19 DIAGNOSIS — J96.11 CHRONIC RESPIRATORY FAILURE WITH HYPOXIA (HCC): ICD-10-CM

## 2022-04-19 PROCEDURE — 71250 CT THORAX DX C-: CPT

## 2022-04-22 DIAGNOSIS — R91.1 PULMONARY NODULE: Primary | ICD-10-CM

## 2022-07-27 ENCOUNTER — OFFICE VISIT (OUTPATIENT)
Dept: PULMONOLOGY | Age: 52
End: 2022-07-27
Payer: COMMERCIAL

## 2022-07-27 VITALS
DIASTOLIC BLOOD PRESSURE: 78 MMHG | HEIGHT: 65 IN | BODY MASS INDEX: 36.79 KG/M2 | OXYGEN SATURATION: 94 % | TEMPERATURE: 97.7 F | WEIGHT: 220.8 LBS | SYSTOLIC BLOOD PRESSURE: 124 MMHG

## 2022-07-27 DIAGNOSIS — Z71.6 TOBACCO ABUSE COUNSELING: ICD-10-CM

## 2022-07-27 DIAGNOSIS — F17.200 CURRENT SMOKER: ICD-10-CM

## 2022-07-27 DIAGNOSIS — J43.2 CENTRILOBULAR EMPHYSEMA (HCC): ICD-10-CM

## 2022-07-27 DIAGNOSIS — J96.11 CHRONIC RESPIRATORY FAILURE WITH HYPOXIA (HCC): ICD-10-CM

## 2022-07-27 DIAGNOSIS — R91.8 MULTIPLE LUNG NODULES ON CT: ICD-10-CM

## 2022-07-27 DIAGNOSIS — J44.9 STAGE 3 SEVERE COPD BY GOLD CLASSIFICATION (HCC): Primary | ICD-10-CM

## 2022-07-27 PROCEDURE — 3023F SPIROM DOC REV: CPT | Performed by: INTERNAL MEDICINE

## 2022-07-27 PROCEDURE — 3017F COLORECTAL CA SCREEN DOC REV: CPT | Performed by: INTERNAL MEDICINE

## 2022-07-27 PROCEDURE — 4004F PT TOBACCO SCREEN RCVD TLK: CPT | Performed by: INTERNAL MEDICINE

## 2022-07-27 PROCEDURE — 99213 OFFICE O/P EST LOW 20 MIN: CPT

## 2022-07-27 PROCEDURE — G8427 DOCREV CUR MEDS BY ELIG CLIN: HCPCS | Performed by: INTERNAL MEDICINE

## 2022-07-27 PROCEDURE — G8417 CALC BMI ABV UP PARAM F/U: HCPCS | Performed by: INTERNAL MEDICINE

## 2022-07-27 PROCEDURE — 99214 OFFICE O/P EST MOD 30 MIN: CPT | Performed by: INTERNAL MEDICINE

## 2022-07-27 RX ORDER — FLUTICASONE PROPIONATE 50 MCG
SPRAY, SUSPENSION (ML) NASAL
COMMUNITY
Start: 2022-07-22

## 2022-07-27 RX ORDER — LIDOCAINE 50 MG/G
PATCH TOPICAL
COMMUNITY
Start: 2022-07-26

## 2022-07-27 RX ORDER — AMOXICILLIN 500 MG/1
CAPSULE ORAL
COMMUNITY
Start: 2022-07-26

## 2022-07-27 RX ORDER — KETOTIFEN FUMARATE 0.35 MG/ML
SOLUTION/ DROPS OPHTHALMIC
COMMUNITY
Start: 2022-04-25

## 2022-07-28 ENCOUNTER — OFFICE VISIT (OUTPATIENT)
Dept: PAIN MANAGEMENT | Age: 52
End: 2022-07-28
Payer: COMMERCIAL

## 2022-07-28 VITALS
WEIGHT: 223 LBS | OXYGEN SATURATION: 94 % | DIASTOLIC BLOOD PRESSURE: 76 MMHG | BODY MASS INDEX: 37.15 KG/M2 | RESPIRATION RATE: 18 BRPM | HEIGHT: 65 IN | SYSTOLIC BLOOD PRESSURE: 126 MMHG | HEART RATE: 93 BPM

## 2022-07-28 DIAGNOSIS — M47.817 LUMBOSACRAL SPONDYLOSIS WITHOUT MYELOPATHY: Primary | ICD-10-CM

## 2022-07-28 DIAGNOSIS — R29.898 WEAKNESS OF BOTH HANDS: ICD-10-CM

## 2022-07-28 DIAGNOSIS — G62.9 PERIPHERAL POLYNEUROPATHY: ICD-10-CM

## 2022-07-28 DIAGNOSIS — M54.16 LUMBAR RADICULOPATHY: ICD-10-CM

## 2022-07-28 DIAGNOSIS — M51.36 DDD (DEGENERATIVE DISC DISEASE), LUMBAR: ICD-10-CM

## 2022-07-28 PROCEDURE — G8427 DOCREV CUR MEDS BY ELIG CLIN: HCPCS | Performed by: NURSE PRACTITIONER

## 2022-07-28 PROCEDURE — 3017F COLORECTAL CA SCREEN DOC REV: CPT | Performed by: NURSE PRACTITIONER

## 2022-07-28 PROCEDURE — 99214 OFFICE O/P EST MOD 30 MIN: CPT | Performed by: NURSE PRACTITIONER

## 2022-07-28 PROCEDURE — 4004F PT TOBACCO SCREEN RCVD TLK: CPT | Performed by: NURSE PRACTITIONER

## 2022-07-28 PROCEDURE — G8417 CALC BMI ABV UP PARAM F/U: HCPCS | Performed by: NURSE PRACTITIONER

## 2022-07-28 RX ORDER — CELECOXIB 200 MG/1
200 CAPSULE ORAL 2 TIMES DAILY
Qty: 60 CAPSULE | Refills: 3 | Status: SHIPPED | OUTPATIENT
Start: 2022-07-28

## 2022-07-28 RX ORDER — GABAPENTIN 800 MG/1
800 TABLET ORAL 3 TIMES DAILY
Qty: 90 TABLET | Refills: 3 | Status: SHIPPED | OUTPATIENT
Start: 2022-07-28 | End: 2022-08-27

## 2022-07-28 ASSESSMENT — ENCOUNTER SYMPTOMS
SORE THROAT: 0
SHORTNESS OF BREATH: 1
CONSTIPATION: 0
BACK PAIN: 1

## 2022-07-28 NOTE — PROGRESS NOTES
Subjective:      Patient ID: Don Neumann is a 45 y.o. female. Chief Complaint   Patient presents with    Back Pain     HPI Last seen one year ago. Recent visit to ER due to pain. Received 5 days worth of steroids. Rx Norco, lidocaine patches. Needs to complete physical therapy to update imaging and return to surgeon, Dr. Amy Agarwal    History lumbar and cervical surgeries, right hip pain. Prescribed meloxicam, gabapentin and flexeril. Muscle cramps-started on magnesium last visit. Medications are providing her with some relief. Denies side effects. Previous pain management patient approximately 5 years ago in [place], has surgery and didn't feel the need to return. Pain Assessment  Location of Pain: Back  Location Modifiers: Posterior, Medial, Lateral  Severity of Pain: 7  Quality of Pain: Throbbing, Sharp, Dull, Aching, Locking, Grinding, Popping, Cracking, Buckling  Duration of Pain: Persistent  Frequency of Pain: Constant  Aggravating Factors: Walking, Standing, Bending (laying sitting)  Limiting Behavior: Yes  Relieving Factors: Heat, Rest, Exercise  Result of Injury: No  Work-Related Injury: No  Are there other pain locations you wish to document?: No    Allergies   Allergen Reactions    Methyldopa Anaphylaxis       Outpatient Medications Marked as Taking for the 7/28/22 encounter (Office Visit) with KEIRY Nascimento CNP   Medication Sig Dispense Refill    celecoxib (CELEBREX) 200 MG capsule Take 1 capsule by mouth in the morning and 1 capsule before bedtime. 60 capsule 3    gabapentin (NEURONTIN) 800 MG tablet Take 1 tablet by mouth in the morning and 1 tablet at noon and 1 tablet before bedtime. Do all this for 30 days.  90 tablet 3    amoxicillin (AMOXIL) 500 MG capsule       fluticasone (FLONASE) 50 MCG/ACT nasal spray SPRAY ONCE IN EACH NOSTRIL ONCE DAILY      HYDROCODONE-ACETAMINOPHEN PO Hydrocodone-Acetaminophen Active 1 TAB PO Q6H 12 3 July 26th, 2022      ketotifen (ZADITOR) 0.025 % ophthalmic solution PLACE ONE DROP INTO BOTH EYES TWICE DAILY AS NEEDED ITCHY EYES      lidocaine (LIDODERM) 5 %       cyclobenzaprine (FLEXERIL) 10 MG tablet TAKE ONE TABLET BY MOUTH THREE TIMES DAILY AS NEEDED FOR MUSCLE SPASMS 60 tablet 5    tiotropium (SPIRIVA RESPIMAT) 2.5 MCG/ACT AERS inhaler Inhale 2 puffs into the lungs daily 3 each 3    albuterol (PROVENTIL) (2.5 MG/3ML) 0.083% nebulizer solution Take 3 mLs by nebulization every 6 hours as needed for Wheezing 360 each 3    albuterol sulfate HFA (VENTOLIN HFA) 108 (90 Base) MCG/ACT inhaler Inhale 2 puffs into the lungs every 6 hours as needed for Wheezing 3 each 3    budesonide-formoterol (SYMBICORT) 160-4.5 MCG/ACT AERO Inhale 2 puffs into the lungs 2 times daily 3 each 3    predniSONE (DELTASONE) 10 MG tablet 40mg PO QD x3D, then 30mg PO QD x 3D, then 20mg PO QDx3D, 10mg PO QD x 3 D then stop. 30 tablet 0    magnesium oxide (MAG-OX) 400 (241.3 Mg) MG TABS tablet TAKE ONE TABLET BY MOUTH TWICE DAILY 60 tablet 5    atorvastatin (LIPITOR) 80 MG tablet Take 80 mg by mouth daily      busPIRone (BUSPAR) 5 MG tablet TAKE ONE TABLET TWICE DAILY FOR ANXIETY      budesonide-formoterol (SYMBICORT) 80-4.5 MCG/ACT AERO Inhale 2 puffs into the lungs 2 times daily 1 Inhaler 6    FLUoxetine (PROZAC) 40 MG capsule TAKE ONE CAPSULE BY MOUTH ONCE DAILY 30 capsule 2    traZODone (DESYREL) 100 MG tablet TAKE ONE TABLET BY MOUTH ONCE DAILY AT BEDTIME 30 tablet 2    loratadine (CLARITIN) 10 MG tablet Take 10 mg by mouth daily      amLODIPine (NORVASC) 10 MG tablet Take 10 mg by mouth daily      fexofenadine (ALLEGRA) 60 MG tablet Take 60 mg by mouth in the morning.       pantoprazole (PROTONIX) 40 MG tablet Take 40 mg by mouth daily         Past Medical History:   Diagnosis Date    Arthritis     COPD (chronic obstructive pulmonary disease) ([facility]     Depression     Hyperlipidemia     Hypertension        Past Surgical History:   Procedure Laterality Date    BACK SURGERY TUBAL LIGATION         History reviewed. No pertinent family history. Social History     Socioeconomic History    Marital status: Single     Spouse name: None    Number of children: None    Years of education: None    Highest education level: None   Tobacco Use    Smoking status: Every Day     Packs/day: 1.00     Years: 40.00     Pack years: 40.00     Types: Cigarettes    Smokeless tobacco: Never   Substance and Sexual Activity    Alcohol use: Not Currently     Comment: rare    Drug use: Never     Review of Systems   Constitutional:  Negative for fever. HENT:  Negative for sore throat. Respiratory:  Positive for shortness of breath (history COPD). Cardiovascular:  Positive for chest pain (intermittent, follows cardiology). Gastrointestinal:  Negative for constipation. History IBS, colitis   Genitourinary:         Intermittent incontinence     Musculoskeletal:  Positive for arthralgias, back pain and myalgias. Neurological:  Positive for dizziness. Psychiatric/Behavioral:  Positive for sleep disturbance. Objective:   Physical Exam  Vitals reviewed. Constitutional:       General: She is not in acute distress. Appearance: Normal appearance. She is well-developed and overweight. She is not ill-appearing, toxic-appearing or diaphoretic. Interventions: She is not intubated. HENT:      Head: Normocephalic and atraumatic. Right Ear: External ear normal.      Left Ear: External ear normal.      Nose: Nose normal.      Mouth/Throat:      Lips: Pink. Mouth: Mucous membranes are moist.   Eyes:      General: Lids are normal.   Cardiovascular:      Rate and Rhythm: Normal rate. Pulmonary:      Effort: Pulmonary effort is normal. No tachypnea, bradypnea, accessory muscle usage, prolonged expiration, respiratory distress or retractions. She is not intubated. Abdominal:      General: Abdomen is flat. Musculoskeletal:      Right hip: Decreased range of motion.       Right foot: No Charcot foot or foot drop. Left foot: No Charcot foot or foot drop. Skin:     General: Skin is warm and dry. Capillary Refill: Capillary refill takes less than 2 seconds. Coloration: Skin is not ashen or jaundiced. Findings: No rash. Nails: There is no clubbing. Neurological:      Mental Status: She is alert and oriented to person, place, and time. GCS: GCS eye subscore is 4. GCS verbal subscore is 5. GCS motor subscore is 6. Cranial Nerves: No cranial nerve deficit. Psychiatric:         Speech: Speech normal.         Behavior: Behavior is cooperative. Assessment / Plan:      Diagnosis Orders   1. Lumbosacral spondylosis without myelopathy        2. Weakness of both hands        3. Peripheral polyneuropathy        4. DDD (degenerative disc disease), lumbar        5.  Lumbar radiculopathy               Increase gabapentin 800 tid   Failed meloxicam and diclofenac, start celebrex 200mg bid  Resume PT  Follow up 3 months

## 2022-07-30 RX ORDER — NICOTINE 21 MG/24HR
1 PATCH, TRANSDERMAL 24 HOURS TRANSDERMAL EVERY 24 HOURS
Qty: 30 PATCH | Refills: 1 | Status: SHIPPED | OUTPATIENT
Start: 2022-07-30 | End: 2022-10-13

## 2022-07-30 NOTE — PROGRESS NOTES
REASON FOR THE CONSULTATION:  COPD  HISTORY OF PRESENT ILLNESS:    Patrick Patient is a 45y.o. year old female   Still smoking 1 pack/day. Wants to quit smoking and would like a nicotine patch. Shortness of breath with exertion is present grade 2 has chronic sputum no hemoptysis no purulence. Compliant with supplemental oxygen and inhalers      Previous visit  Patient is doing well shortness of breath is stable with exertion   she did not have any hospital visits for COPD exacerbation or need steroids. Sputum is nonpurulent no hemoptysis   she is compliant with her inhalers. Last visit    here for evaluation of shortness of breath. Patient has been short of breath for few years but it has slowly been progressing. Patient gets short of breath walking short distances and at times she also wheezes. She has a lot of sputum throughout the day but no hemoptysis sputum is clear without any purulence. Patient's now smoking 1 pack/day but she was up to 4 packs/day for 5 years and she has smoked for total 40 years on average 2 packs/day. In 2019 she had cavitary pneumonia of the right lung and was treated at [facility]. Patient does not do drugs or have animals or birds at home. 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   She is not on home oxygen          LUNG CANCER SCREENING     CRITERIA MET    []     CT ORDERED  []      CRITERIA NOT MET   [x]      REFUSED                    []        REASON CRITERIA NOT MET     SMOKING LESS THAN 30 PY  []      AGE LESS THAN 55 or GREATER 77 YEARS  []      QUIT SMOKING 15 YEARS OR GREATER   []      RECENT CT WITH IN 11 MONTHS    []      LIFE EXPECTANCY < 5 YEARS   []      SIGNS  AND SYMPTOMS OF LUNG CANCER   []         Immunization   Immunization History   Administered Date(s) Administered    COVID-19, PFIZER PURPLE top, DILUTE for use, (age 15 y+), 30mcg/0.3mL 05/15/2021, 06/05/2021    Influenza Virus Vaccine 02/12/2020, 02/12/2020, 11/10/2020    PPD Test 06/08/2019    Pneumococcal Polysaccharide ([id number]) 06/13/2019        Pneumococcal Vaccine     [] Up to date    [] Indicated   [] Refused  [] Contraindicated       Influenza Vaccine   [] Up to date    [] Indicated   [] Refused  [] Contraindicated        Pulmonary Rehab   [] Completed   [] Indicated   [] Refused  [] Contraindicated   PAST MEDICAL HISTORY:       Diagnosis Date    Arthritis     COPD (chronic obstructive pulmonary disease) ([facility] [address]     Depression     Hyperlipidemia     Hypertension          Family History:   No family history on file. SURGICAL HISTORY:   Past Surgical History:   Procedure Laterality Date    BACK SURGERY      TUBAL LIGATION             SOCIAL AND OCCUPATIONAL HEALTH:      There  No history of TB or TB exposure. There  No asbestos ,[place] dust exposure. The patient reports  No coal mine, [place], [place], [facility] exposure. History of travel outside the country No  There  is use of   marijuana No   VapingNo  IV heroinNo  Crack cocaineNo  There  Nohot tub exposure. Pets -cats No, dogsNo  Birds No    Occupational history kitchen    TOBACCO:   reports that she has been smoking cigarettes. She has a 40.00 pack-year smoking history. She has never used smokeless tobacco.  ETOH:   reports that she does not currently use alcohol. ALLERGIES:      Allergies   Allergen Reactions    Methyldopa Anaphylaxis         Home Meds:   Prior to Admission medications    Medication Sig Start Date End Date Taking?  Authorizing Provider   nicotine (NICODERM CQ) 21 MG/24HR Place 1 patch onto the skin every 24 hours 7/30/22 7/30/23 Yes Mary Carey MD   fluticasone (FLONASE) 50 MCG/ACT nasal spray SPRAY ONCE IN EACH NOSTRIL ONCE DAILY 7/22/22  Yes Historical Provider, MD   HYDROCODONE-ACETAMINOPHEN PO Hydrocodone-Acetaminophen Active 1 TAB PO Q6H 12 3 July 26th, 2022 7/26/22  Yes Historical Provider, MD   ketotifen (ZADITOR) 0.025 % ophthalmic solution PLACE ONE DROP INTO BOTH EYES TWICE DAILY AS NEEDED ITCHY EYES 4/25/22  Yes Historical Provider, MD   lidocaine (LIDODERM) 5 %  7/26/22  Yes Historical Provider, MD   cyclobenzaprine (FLEXERIL) 10 MG tablet TAKE ONE TABLET BY MOUTH THREE TIMES DAILY AS NEEDED FOR MUSCLE SPASMS 4/15/22  Yes Yulia Crocker MD   tiotropium (SPIRIVA RESPIMAT) 2.5 MCG/ACT AERS inhaler Inhale 2 puffs into the lungs daily 3/22/22 7/28/22 Yes KEIRY Hoang CNP   albuterol (PROVENTIL) (2.5 MG/3ML) 0.083% nebulizer solution Take 3 mLs by nebulization every 6 hours as needed for Wheezing 3/22/22  Yes KEIRY Hoang CNP   albuterol sulfate HFA (VENTOLIN HFA) 108 (90 Base) MCG/ACT inhaler Inhale 2 puffs into the lungs every 6 hours as needed for Wheezing 3/22/22  Yes KEIRY Hoang CNP   predniSONE (DELTASONE) 10 MG tablet 40mg PO QD x3D, then 30mg PO QD x 3D, then 20mg PO QDx3D, 10mg PO QD x 3 D then stop. 3/22/22  Yes KEIRY Hoang CNP   magnesium oxide (MAG-OX) 400 (241.3 Mg) MG TABS tablet TAKE ONE TABLET BY MOUTH TWICE DAILY 11/23/21  Yes KEIRY Hanna CNP   atorvastatin (LIPITOR) 80 MG tablet Take 80 mg by mouth daily 6/10/21 7/28/22 Yes Historical Provider, MD   budesonide-formoterol (SYMBICORT) 80-4.5 MCG/ACT AERO Inhale 2 puffs into the lungs 2 times daily 3/30/21 7/28/22 Yes Kevon Lemons MD   traZODone (DESYREL) 100 MG tablet TAKE ONE TABLET BY MOUTH ONCE DAILY AT BEDTIME 3/29/21  Yes Felicia Cedeño MD   loratadine (CLARITIN) 10 MG tablet Take 10 mg by mouth daily   Yes Historical Provider, MD   amLODIPine (NORVASC) 10 MG tablet Take 10 mg by mouth daily   Yes Historical Provider, MD   pantoprazole (PROTONIX) 40 MG tablet Take 40 mg by mouth daily   Yes Historical Provider, MD   celecoxib (CELEBREX) 200 MG capsule Take 1 capsule by mouth in the morning and 1 capsule before bedtime.  7/28/22   KEIRY Hanna - CNP   gabapentin (NEURONTIN) 800 MG tablet Take 1 tablet by mouth in the morning and 1 tablet at noon and 1 tablet before bedtime. Do all this for 30 days. 7/28/22 8/27/22  KEIRY Gage - CNP   amoxicillin (AMOXIL) 500 MG capsule  7/26/22   Historical Provider, MD   budesonide-formoterol [facility]) 160-4.5 MCG/ACT AERO Inhale 2 puffs into the lungs 2 times daily 3/22/22   KEIRY Holloway - CNP   busPIRone (BUSPAR) 5 MG tablet TAKE ONE TABLET TWICE DAILY FOR ANXIETY 5/21/21   Historical Provider, MD   FLUoxetine (PROZAC) 40 MG capsule TAKE ONE CAPSULE BY MOUTH ONCE DAILY 6/51/30   Gayb Merino MD   fexofenadine (ALLEGRA) 60 MG tablet Take 60 mg by mouth in the morning. Historical Provider, MD            Review of Systems -  General ROS: negative for - chills, fatigue, fever or weight loss  ENT ROS: negative for - headaches, oral lesions or sore throat  Cardiovascular ROS: no chest pain , orthopnea or pnd   Gastrointestinal ROS: no abdominal pain, change in bowel habits, or black or bloody stools  Skin - no rash   Neuro - no blurry vision , no loc . No focal weakness   msk -pain on the left axillary region on deep breathing and coughing  Vascular - no claudication , rest completed and negative   Lymphatic - complete and negative   Hematology - oncology - complete and negative   Allergy immunology - complete and negative    no burning or hematuria    Vitals:  /78 (Site: Right Upper Arm, Position: Sitting, Cuff Size: Large Adult)   Temp 97.7 °F (36.5 °C)   Ht 5' 5\" (1.651 m)   Wt 220 lb 12.8 oz (100.2 kg)   SpO2 94%   BMI 36.74 kg/m²     PHYSICAL EXAM:  Head and neck atraumatic, normocephalic    Lymph nodes-no cervical, supraclavicular lymphadenopathy    Neck-no JVP elevation    Lungs - AP diameter of chest increased. Thoracic expansion and diaphragmatic excursion diminished. BS diminished and expiratory phase prolonged. No dullness to percussion or tenderness to palpation. No bronchial breath sounds . CVS- S1, S2 regular.   No S3 no S4, no murmurs    Abdomen-nontender, nondistended. Bowel sounds are present. No organomegaly    Lower extremity-no edema    Upper extremity-no edema    Neurological-grossly normal cranial nerves. No overt motor deficit      Reason for Exam: History of COPD, cough, and smoking 0.5 to 1ppd for 40 years   Acuity: Chronic   Type of Exam: Initial       FINDINGS:   Heart appears normal in size. Mild lung scarring. No focal consolidation,   pneumothorax or pleural effusion. No free air. Osseous structures   demonstrate degenerative changes. Impression   No acute findings. PULMONARY FUNCTION     PATIENT NAME: Irvin Galvez                  :        1970  MED REC NO:   [id number]                             ROOM:  ACCOUNT NO:   [de-identified]                           ADMIT DATE: 10/13/2020  PROVIDER:     Jerson Marquez     DATE OF PROCEDURE:  10/13/2020     INTERPRETATION:  The patient's spirometry shows obstructive flow volume  loop of severe nature. FEV1 is 35% predicted. FVC is 50% predicted. There was no positive bronchodilator change. FEV1/FVC ratio is 56%. Lung volumes by body box, total lung capacity is 93% predicted, %  predicted, and diffusion capacity uncorrected 68% predicted, corrected  for alveolar volume 101% predicted. Airway resistance is normal.     FINAL IMPRESSION:  This study shows severe stage III COPD without  positive bronchodilator change. There is air trapping and there is  moderately decreased diffusion capacity, which could be due to  underlying emphysema. Clinical correlation advised. IMPRESSION:     Diagnosis Orders   1. Stage 3 severe COPD by GOLD classification ([address]        2. Current smoker  nicotine (NICODERM CQ) 21 MG/24HR      3. Chronic respiratory failure with hypoxia (HCC)        4. Centrilobular emphysema ([address]        5. Tobacco abuse counseling  nicotine (NICODERM CQ) 21 MG/24HR      6.  Multiple lung nodules on CT :                PLAN:      CT chest to evaluate groundglass opacities and subpleural nodule is pending continue present bronchodilator therapy with Symbicort and Spiriva . Advised smoking cessation . Will give patch of nicotine 21 mg . Advised not to smoke while using patch   Use supplemental oxygen as prescribed . As needed albuterol . Follow-up 6 months patient fell 3 days ago on the left chest and has been having discomfort there is no skin bruising. Will obtain x-ray of the ribs and the chest to rule out rib fracture. Advised pain control with Tylenol or Motrin. Advised smoking cessation  Continue Spiriva and Symbicort  Continue supplemental oxygen as prescribed  Use albuterol as needed  Follow-up 6 months    Requested Prescriptions     Signed Prescriptions Disp Refills    nicotine (NICODERM CQ) 21 MG/24HR 30 patch 1     Sig: Place 1 patch onto the skin every 24 hours       There are no discontinued medications. Jimmy Sigala received counseling on the following healthy behaviors: nutrition, exercise and medication adherence    Patient given educational materials : see patient instruction       Discussed use, benefit, and side effects of prescribed medications. Barriers to medication compliance addressed. All patient questions answered. Pt voiced understanding. I hope this updates you on my evaluation and clinical thinking. Thank you for allowing me to participate in his care. Sincerely,      Electronically signed by Binu Maxwell MD on 7/30/2022 at 1:56 PM       Please note that this chart was generated using voice recognition Dragon dictation software. Although every effort was made to ensure the accuracy of this automated transcription, some errors in transcription may have occurred.

## 2022-10-10 DIAGNOSIS — Z71.6 TOBACCO ABUSE COUNSELING: ICD-10-CM

## 2022-10-10 DIAGNOSIS — F17.200 CURRENT SMOKER: ICD-10-CM

## 2022-10-13 RX ORDER — NICOTINE 21 MG/24HR
PATCH, TRANSDERMAL 24 HOURS TRANSDERMAL
Qty: 28 PATCH | Refills: 1 | Status: SHIPPED | OUTPATIENT
Start: 2022-10-13

## 2022-10-20 ENCOUNTER — HOSPITAL ENCOUNTER (OUTPATIENT)
Dept: CT IMAGING | Age: 52
Discharge: HOME | End: 2022-10-22
Payer: COMMERCIAL

## 2022-10-20 DIAGNOSIS — R91.1 PULMONARY NODULE: ICD-10-CM

## 2022-10-20 PROCEDURE — 71250 CT THORAX DX C-: CPT

## 2022-11-07 ENCOUNTER — NURSE ONLY (OUTPATIENT)
Dept: LAB | Age: 52
End: 2022-11-07
Payer: COMMERCIAL

## 2022-11-07 ENCOUNTER — OFFICE VISIT (OUTPATIENT)
Dept: PAIN MANAGEMENT | Age: 52
End: 2022-11-07
Payer: COMMERCIAL

## 2022-11-07 VITALS
HEIGHT: 65 IN | WEIGHT: 224 LBS | DIASTOLIC BLOOD PRESSURE: 78 MMHG | BODY MASS INDEX: 37.32 KG/M2 | SYSTOLIC BLOOD PRESSURE: 124 MMHG

## 2022-11-07 DIAGNOSIS — G62.9 PERIPHERAL POLYNEUROPATHY: Primary | ICD-10-CM

## 2022-11-07 DIAGNOSIS — M47.817 LUMBOSACRAL SPONDYLOSIS WITHOUT MYELOPATHY: ICD-10-CM

## 2022-11-07 DIAGNOSIS — M54.16 LUMBAR RADICULOPATHY: ICD-10-CM

## 2022-11-07 DIAGNOSIS — M51.36 DDD (DEGENERATIVE DISC DISEASE), LUMBAR: ICD-10-CM

## 2022-11-07 PROCEDURE — 3074F SYST BP LT 130 MM HG: CPT | Performed by: NURSE PRACTITIONER

## 2022-11-07 PROCEDURE — G8417 CALC BMI ABV UP PARAM F/U: HCPCS | Performed by: NURSE PRACTITIONER

## 2022-11-07 PROCEDURE — 3017F COLORECTAL CA SCREEN DOC REV: CPT | Performed by: NURSE PRACTITIONER

## 2022-11-07 PROCEDURE — PBSHW PBB SHADOW CHARGE: Performed by: NURSE PRACTITIONER

## 2022-11-07 PROCEDURE — 99214 OFFICE O/P EST MOD 30 MIN: CPT | Performed by: NURSE PRACTITIONER

## 2022-11-07 PROCEDURE — 4004F PT TOBACCO SCREEN RCVD TLK: CPT | Performed by: NURSE PRACTITIONER

## 2022-11-07 PROCEDURE — G8484 FLU IMMUNIZE NO ADMIN: HCPCS | Performed by: NURSE PRACTITIONER

## 2022-11-07 PROCEDURE — 3078F DIAST BP <80 MM HG: CPT | Performed by: NURSE PRACTITIONER

## 2022-11-07 PROCEDURE — G8427 DOCREV CUR MEDS BY ELIG CLIN: HCPCS | Performed by: NURSE PRACTITIONER

## 2022-11-07 RX ORDER — KETOROLAC TROMETHAMINE 30 MG/ML
60 INJECTION, SOLUTION INTRAMUSCULAR; INTRAVENOUS ONCE
Status: COMPLETED | OUTPATIENT
Start: 2022-11-07 | End: 2022-11-07

## 2022-11-07 RX ORDER — KETOROLAC TROMETHAMINE 10 MG/1
10 TABLET, FILM COATED ORAL EVERY 6 HOURS PRN
Qty: 20 TABLET | Refills: 0 | Status: SHIPPED | OUTPATIENT
Start: 2022-11-07

## 2022-11-07 RX ORDER — ETODOLAC 400 MG/1
400 TABLET, FILM COATED ORAL 2 TIMES DAILY
Qty: 60 TABLET | Refills: 3 | Status: SHIPPED | OUTPATIENT
Start: 2022-11-07 | End: 2023-11-07

## 2022-11-07 RX ADMIN — KETOROLAC TROMETHAMINE 60 MG: 30 INJECTION, SOLUTION INTRAMUSCULAR at 14:57

## 2022-11-07 ASSESSMENT — ENCOUNTER SYMPTOMS
SORE THROAT: 0
CONSTIPATION: 0
BACK PAIN: 1
SHORTNESS OF BREATH: 1

## 2022-11-07 NOTE — PROGRESS NOTES
Subjective:      Patient ID: Briseida Paz is a 47 y.o. female. Chief Complaint   Patient presents with    Back Pain     3 month     HPI   Here today for routine pain clinic recheck. History lumbar and cervical surgeries, right hip pain. Failed meloxicam, diclofenac, gabapentin and flexeril. Currently on celebrex, not sure if it works. Failed tramadol, makes her feel weird. Decline stronger opiate    Previous pain management patient approximately 5 years ago in [facility], has surgery and didn't feel the need to return. Failed multiple injections, failed physical therapy    Pain Assessment  Location of Pain: Back  Severity of Pain: 5  Quality of Pain: Throbbing, Sharp, Dull, Aching, Locking, Grinding, Popping  Duration of Pain: Persistent  Frequency of Pain: Constant  Aggravating Factors: Stairs, Walking, Standing, Squatting, Kneeling, Exercise, Straightening, Stretching, Bending  Limiting Behavior: Yes  Relieving Factors: Rest, Ice, Heat (meds)  Result of Injury: No  Work-Related Injury: No    Allergies   Allergen Reactions    Methyldopa Anaphylaxis       Outpatient Medications Marked as Taking for the 11/7/22 encounter (Office Visit) with KEIRY Gleason CNP   Medication Sig Dispense Refill    celecoxib (CELEBREX) 200 MG capsule Take 1 capsule by mouth in the morning and 1 capsule before bedtime. 60 capsule 3    gabapentin (NEURONTIN) 800 MG tablet Take 1 tablet by mouth in the morning and 1 tablet at noon and 1 tablet before bedtime. Do all this for 30 days.  90 tablet 3    ketotifen (ZADITOR) 0.025 % ophthalmic solution PLACE ONE DROP INTO BOTH EYES TWICE DAILY AS NEEDED ITCHY EYES      cyclobenzaprine (FLEXERIL) 10 MG tablet TAKE ONE TABLET BY MOUTH THREE TIMES DAILY AS NEEDED FOR MUSCLE SPASMS 60 tablet 5    tiotropium (SPIRIVA RESPIMAT) 2.5 MCG/ACT AERS inhaler Inhale 2 puffs into the lungs daily 3 each 3    albuterol sulfate HFA (VENTOLIN HFA) 108 (90 Base) MCG/ACT inhaler Inhale 2 puffs into the lungs every 6 hours as needed for Wheezing 3 each 3    budesonide-formoterol (SYMBICORT) 160-4.5 MCG/ACT AERO Inhale 2 puffs into the lungs 2 times daily 3 each 3    magnesium oxide (MAG-OX) 400 (241.3 Mg) MG TABS tablet TAKE ONE TABLET BY MOUTH TWICE DAILY 60 tablet 5    atorvastatin (LIPITOR) 80 MG tablet Take 80 mg by mouth daily      traZODone (DESYREL) 100 MG tablet TAKE ONE TABLET BY MOUTH ONCE DAILY AT BEDTIME 30 tablet 2    loratadine (CLARITIN) 10 MG tablet Take 10 mg by mouth daily      amLODIPine (NORVASC) 10 MG tablet Take 10 mg by mouth daily      fexofenadine (ALLEGRA) 60 MG tablet Take 60 mg by mouth in the morning. pantoprazole (PROTONIX) 40 MG tablet Take 40 mg by mouth daily         Past Medical History:   Diagnosis Date    Arthritis     COPD (chronic obstructive pulmonary disease) ([facility] [address]     Depression     Hyperlipidemia     Hypertension        Past Surgical History:   Procedure Laterality Date    BACK SURGERY      TUBAL LIGATION         History reviewed. No pertinent family history. Social History     Socioeconomic History    Marital status: Single     Spouse name: None    Number of children: None    Years of education: None    Highest education level: None   Tobacco Use    Smoking status: Every Day     Packs/day: 1.00     Years: 40.00     Pack years: 40.00     Types: Cigarettes    Smokeless tobacco: Never   Substance and Sexual Activity    Alcohol use: Not Currently     Comment: rare    Drug use: Never     Review of Systems   Constitutional:  Negative for fever. HENT:  Negative for sore throat. Respiratory:  Positive for shortness of breath (history COPD). Cardiovascular:  Positive for chest pain (intermittent, follows cardiology). Gastrointestinal:  Negative for constipation. History IBS, colitis   Genitourinary:         Intermittent incontinence     Musculoskeletal:  Positive for arthralgias, back pain and myalgias. Neurological:  Positive for dizziness. Psychiatric/Behavioral:  Positive for sleep disturbance. Objective:   Physical Exam  Vitals reviewed. Constitutional:       General: She is not in acute distress. Appearance: Normal appearance. She is well-developed and overweight. She is not ill-appearing, toxic-appearing or diaphoretic. Interventions: She is not intubated. HENT:      Head: Normocephalic and atraumatic. Right Ear: External ear normal.      Left Ear: External ear normal.      Nose: Nose normal.      Mouth/Throat:      Lips: Pink. Mouth: Mucous membranes are moist.   Eyes:      General: Lids are normal.   Cardiovascular:      Rate and Rhythm: Normal rate. Pulmonary:      Effort: Pulmonary effort is normal. No tachypnea, bradypnea, accessory muscle usage, prolonged expiration, respiratory distress or retractions. She is not intubated. Abdominal:      General: Abdomen is flat. Musculoskeletal:      Right hip: Decreased range of motion. Right foot: No Charcot foot or foot drop. Left foot: No Charcot foot or foot drop. Skin:     General: Skin is warm and dry. Capillary Refill: Capillary refill takes less than 2 seconds. Coloration: Skin is not ashen or jaundiced. Findings: No rash. Nails: There is no clubbing. Neurological:      Mental Status: She is alert and oriented to person, place, and time. GCS: GCS eye subscore is 4. GCS verbal subscore is 5. GCS motor subscore is 6. Cranial Nerves: No cranial nerve deficit. Psychiatric:         Speech: Speech normal.         Behavior: Behavior is cooperative. Assessment / Plan:      Diagnosis Orders   1. Peripheral polyneuropathy        2. Lumbar radiculopathy        3. Lumbosacral spondylosis without myelopathy        4.  DDD (degenerative disc disease), lumbar               Increase gabapentin 800 tid   Failed meloxicam and diclofenac, start celebrex 200mg bid  Resume PT  Follow up 3 months

## 2022-12-13 DIAGNOSIS — M54.2 CERVICALGIA: ICD-10-CM

## 2022-12-13 DIAGNOSIS — M25.551 RIGHT HIP PAIN: ICD-10-CM

## 2022-12-13 NOTE — TELEPHONE ENCOUNTER
Sudhir De La Paz  called requesting a refill of the below medication which has been pended for you:     Requested Prescriptions     Pending Prescriptions Disp Refills    magnesium oxide (MAG-OX) 400 (240 Mg) MG tablet [Pharmacy Med Name: magnesium oxide 400 mg (241.3 mg magnesium) tablet] 60 tablet 5     Sig: TAKE ONE TABLET BY MOUTH TWICE DAILY       Allergies   Allergen Reactions    Methyldopa Anaphylaxis               Last Appointment:  7/21/2021   Next Appointment:  Visit date not found

## 2022-12-14 RX ORDER — LANOLIN ALCOHOL/MO/W.PET/CERES
CREAM (GRAM) TOPICAL
Qty: 60 TABLET | Refills: 5 | Status: SHIPPED | OUTPATIENT
Start: 2022-12-14

## 2023-01-01 NOTE — TELEPHONE ENCOUNTER
Last Appt:  7/21/2021  Next Appt:   Visit date not found  Med verified in [address] hard copy, drawn during this pregnancy

## 2023-01-20 DIAGNOSIS — J44.9 STAGE 3 SEVERE COPD BY GOLD CLASSIFICATION (HCC): Primary | ICD-10-CM

## 2023-03-30 DIAGNOSIS — J43.2 CENTRILOBULAR EMPHYSEMA (HCC): ICD-10-CM

## 2023-03-30 DIAGNOSIS — J44.9 STAGE 3 SEVERE COPD BY GOLD CLASSIFICATION (HCC): ICD-10-CM

## 2023-03-31 RX ORDER — TIOTROPIUM BROMIDE INHALATION SPRAY 3.12 UG/1
SPRAY, METERED RESPIRATORY (INHALATION)
Qty: 12 G | Refills: 3 | Status: SHIPPED | OUTPATIENT
Start: 2023-03-31

## 2023-04-04 RX ORDER — CYCLOBENZAPRINE HCL 10 MG
TABLET ORAL
Qty: 60 TABLET | Refills: 5 | Status: SHIPPED | OUTPATIENT
Start: 2023-04-04

## 2023-04-04 RX ORDER — ALBUTEROL SULFATE 90 UG/1
AEROSOL, METERED RESPIRATORY (INHALATION)
Qty: 54 G | Refills: 3 | Status: SHIPPED | OUTPATIENT
Start: 2023-04-04

## 2023-04-04 RX ORDER — GABAPENTIN 800 MG/1
TABLET ORAL
Qty: 90 TABLET | Refills: 3 | Status: SHIPPED | OUTPATIENT
Start: 2023-04-04 | End: 2023-05-04

## 2023-04-04 RX ORDER — BUDESONIDE AND FORMOTEROL FUMARATE DIHYDRATE 160; 4.5 UG/1; UG/1
AEROSOL RESPIRATORY (INHALATION)
Qty: 30.6 G | Refills: 3 | Status: SHIPPED | OUTPATIENT
Start: 2023-04-04

## 2023-04-04 NOTE — TELEPHONE ENCOUNTER
Gabapentin 800 mg  Last Appt:  11/7/2022  Next Appt:   Visit date not found  Med verified in [facility]

## 2023-04-04 NOTE — TELEPHONE ENCOUNTER
Flexeril 10 mg   Last Appt:  11/7/23  Next Appt:   Visit date not found  Med verified in [address]

## 2023-05-03 ENCOUNTER — OFFICE VISIT (OUTPATIENT)
Dept: PULMONOLOGY | Age: 53
End: 2023-05-03
Payer: COMMERCIAL

## 2023-05-03 ENCOUNTER — HOSPITAL ENCOUNTER (OUTPATIENT)
Dept: PULMONOLOGY | Age: 53
Discharge: HOME | End: 2023-05-03
Payer: COMMERCIAL

## 2023-05-03 VITALS
TEMPERATURE: 97.7 F | OXYGEN SATURATION: 90 % | DIASTOLIC BLOOD PRESSURE: 82 MMHG | BODY MASS INDEX: 35.62 KG/M2 | SYSTOLIC BLOOD PRESSURE: 134 MMHG | WEIGHT: 213.8 LBS | HEIGHT: 65 IN | HEART RATE: 102 BPM

## 2023-05-03 DIAGNOSIS — J44.9 STAGE 4 VERY SEVERE COPD BY GOLD CLASSIFICATION (HCC): Primary | ICD-10-CM

## 2023-05-03 DIAGNOSIS — J43.2 CENTRILOBULAR EMPHYSEMA (HCC): ICD-10-CM

## 2023-05-03 DIAGNOSIS — F17.200 CURRENT SMOKER: ICD-10-CM

## 2023-05-03 DIAGNOSIS — J44.9 STAGE 3 SEVERE COPD BY GOLD CLASSIFICATION (HCC): ICD-10-CM

## 2023-05-03 DIAGNOSIS — J96.11 CHRONIC RESPIRATORY FAILURE WITH HYPOXIA (HCC): ICD-10-CM

## 2023-05-03 PROCEDURE — 99213 OFFICE O/P EST LOW 20 MIN: CPT | Performed by: INTERNAL MEDICINE

## 2023-05-03 PROCEDURE — 94640 AIRWAY INHALATION TREATMENT: CPT

## 2023-05-03 PROCEDURE — 6370000000 HC RX 637 (ALT 250 FOR IP): Performed by: INTERNAL MEDICINE

## 2023-05-03 PROCEDURE — 94726 PLETHYSMOGRAPHY LUNG VOLUMES: CPT

## 2023-05-03 PROCEDURE — 94060 EVALUATION OF WHEEZING: CPT

## 2023-05-03 PROCEDURE — 94729 DIFFUSING CAPACITY: CPT

## 2023-05-03 RX ORDER — ALBUTEROL SULFATE 90 UG/1
4 AEROSOL, METERED RESPIRATORY (INHALATION) ONCE
Status: COMPLETED | OUTPATIENT
Start: 2023-05-03 | End: 2023-05-03

## 2023-05-03 RX ORDER — GUAIFENESIN 600 MG/1
600 TABLET, EXTENDED RELEASE ORAL DAILY
Qty: 30 TABLET | Refills: 4 | Status: SHIPPED | OUTPATIENT
Start: 2023-05-03 | End: 2023-06-02

## 2023-05-03 RX ORDER — PREDNISONE 10 MG/1
40 TABLET ORAL DAILY
Qty: 20 TABLET | Refills: 0 | Status: SHIPPED | OUTPATIENT
Start: 2023-05-03 | End: 2023-05-08

## 2023-05-03 RX ORDER — AZITHROMYCIN 250 MG/1
TABLET, FILM COATED ORAL
Qty: 6 TABLET | Refills: 0 | Status: SHIPPED | OUTPATIENT
Start: 2023-05-03

## 2023-05-03 RX ADMIN — ALBUTEROL SULFATE 4 PUFF: 90 AEROSOL, METERED RESPIRATORY (INHALATION) at 14:22

## 2023-05-06 NOTE — PROGRESS NOTES
PM       Please note that this chart was generated using voice recognition Dragon dictation software. Although every effort was made to ensure the accuracy of this automated transcription, some errors in transcription may have occurred.

## 2023-05-07 LAB
DLCO %PRED: NORMAL
DLCO PRED: NORMAL
DLCO/VA %PRED: NORMAL
DLCO/VA PRED: NORMAL
DLCO/VA: NORMAL
DLCO: NORMAL
EXPIRATORY TIME-POST: NORMAL
EXPIRATORY TIME: NORMAL
FEF 25-75% %CHNG: NORMAL
FEF 25-75% %PRED-POST: NORMAL
FEF 25-75% %PRED-PRE: NORMAL
FEF 25-75% PRED: NORMAL
FEF 25-75%-POST: NORMAL
FEF 25-75%-PRE: NORMAL
FEV1 %PRED-POST: NORMAL
FEV1 %PRED-PRE: NORMAL
FEV1 PRED: NORMAL
FEV1-POST: NORMAL
FEV1-PRE: NORMAL
FEV1/FVC %PRED-POST: NORMAL
FEV1/FVC %PRED-PRE: NORMAL
FEV1/FVC PRED: NORMAL
FEV1/FVC-POST: NORMAL
FEV1/FVC-PRE: NORMAL
FVC %PRED-POST: NORMAL
FVC %PRED-PRE: NORMAL
FVC PRED: NORMAL
FVC-POST: NORMAL
FVC-PRE: NORMAL
GAW %PRED: NORMAL
GAW PRED: NORMAL
GAW: NORMAL
IC %PRED: NORMAL
IC PRED: NORMAL
IC: NORMAL
MEP: NORMAL
MIP: NORMAL
MVV %PRED-PRE: NORMAL
MVV PRED: NORMAL
MVV-PRE: NORMAL
PEF %PRED-POST: NORMAL
PEF %PRED-PRE: NORMAL
PEF PRED: NORMAL
PEF%CHNG: NORMAL
PEF-POST: NORMAL
PEF-PRE: NORMAL
RAW %PRED: NORMAL
RAW PRED: NORMAL
RAW: NORMAL
RV %PRED: NORMAL
RV PRED: NORMAL
RV: NORMAL
SVC %PRED: NORMAL
SVC PRED: NORMAL
SVC: NORMAL
TLC %PRED: NORMAL
TLC PRED: NORMAL
TLC: NORMAL
VA %PRED: NORMAL
VA PRED: NORMAL
VA: NORMAL
VTG %PRED: NORMAL
VTG PRED: NORMAL
VTG: NORMAL

## 2023-05-11 DIAGNOSIS — M51.36 DDD (DEGENERATIVE DISC DISEASE), LUMBAR: ICD-10-CM

## 2023-05-11 DIAGNOSIS — G62.9 PERIPHERAL POLYNEUROPATHY: ICD-10-CM

## 2023-05-11 DIAGNOSIS — M54.16 LUMBAR RADICULOPATHY: ICD-10-CM

## 2023-05-11 DIAGNOSIS — M47.817 LUMBOSACRAL SPONDYLOSIS WITHOUT MYELOPATHY: ICD-10-CM

## 2023-05-11 RX ORDER — ETODOLAC 400 MG/1
TABLET, FILM COATED ORAL
Qty: 60 TABLET | Refills: 3 | Status: SHIPPED | OUTPATIENT
Start: 2023-05-11

## 2023-05-11 NOTE — TELEPHONE ENCOUNTER
Lodine 400 mg  Last Appt:  11/7/2022  Next Appt:   Visit date not found  Med verified in [facility]

## 2023-06-22 ENCOUNTER — TELEPHONE (OUTPATIENT)
Dept: PULMONOLOGY | Age: 53
End: 2023-06-22

## 2023-06-27 ENCOUNTER — TELEPHONE (OUTPATIENT)
Dept: PULMONOLOGY | Age: 53
End: 2023-06-27

## 2023-06-28 RX ORDER — PREDNISONE 10 MG/1
10 TABLET ORAL DAILY
Qty: 30 TABLET | Refills: 1 | Status: SHIPPED | OUTPATIENT
Start: 2023-06-28 | End: 2023-08-27

## 2023-08-02 ENCOUNTER — OFFICE VISIT (OUTPATIENT)
Dept: PULMONOLOGY | Age: 53
End: 2023-08-02
Payer: COMMERCIAL

## 2023-08-02 VITALS
HEART RATE: 91 BPM | DIASTOLIC BLOOD PRESSURE: 76 MMHG | TEMPERATURE: 97.5 F | HEIGHT: 65 IN | SYSTOLIC BLOOD PRESSURE: 132 MMHG | BODY MASS INDEX: 35.96 KG/M2 | WEIGHT: 215.8 LBS | OXYGEN SATURATION: 91 %

## 2023-08-02 DIAGNOSIS — J43.2 CENTRILOBULAR EMPHYSEMA (HCC): ICD-10-CM

## 2023-08-02 DIAGNOSIS — Z87.891 PERSONAL HISTORY OF TOBACCO USE: ICD-10-CM

## 2023-08-02 DIAGNOSIS — J96.11 CHRONIC RESPIRATORY FAILURE WITH HYPOXIA (HCC): ICD-10-CM

## 2023-08-02 DIAGNOSIS — J44.9 STAGE 4 VERY SEVERE COPD BY GOLD CLASSIFICATION (HCC): Primary | ICD-10-CM

## 2023-08-02 DIAGNOSIS — F17.200 CURRENT SMOKER: ICD-10-CM

## 2023-08-02 PROCEDURE — 99213 OFFICE O/P EST LOW 20 MIN: CPT | Performed by: INTERNAL MEDICINE

## 2023-08-02 PROCEDURE — G0296 VISIT TO DETERM LDCT ELIG: HCPCS | Performed by: INTERNAL MEDICINE

## 2023-08-02 RX ORDER — AZITHROMYCIN 250 MG/1
TABLET, FILM COATED ORAL
Qty: 6 TABLET | Refills: 0 | Status: SHIPPED | OUTPATIENT
Start: 2023-08-02

## 2023-08-02 RX ORDER — PREDNISONE 10 MG/1
30 TABLET ORAL DAILY
Qty: 15 TABLET | Refills: 1 | Status: SHIPPED | OUTPATIENT
Start: 2023-08-02 | End: 2023-08-07

## 2023-08-02 RX ORDER — MIRABEGRON 25 MG/1
25 TABLET, FILM COATED, EXTENDED RELEASE ORAL DAILY
COMMUNITY
Start: 2023-07-14

## 2023-08-04 NOTE — PROGRESS NOTES
REASON FOR THE CONSULTATION:  COPD  HISTORY OF PRESENT ILLNESS:    Burak Rees is a 47y.o. year old female   Still smoking - using oxygen , complaint with bronchodilator   No active wheeze . No hemoptysis         Previous visit  Patient is doing well shortness of breath is stable with exertion   she did not have any hospital visits for COPD exacerbation or need steroids. Sputum is nonpurulent no hemoptysis   she is compliant with her inhalers. Last visit    here for evaluation of shortness of breath. Patient has been short of breath for few years but it has slowly been progressing. Patient gets short of breath walking short distances and at times she also wheezes. She has a lot of sputum throughout the day but no hemoptysis sputum is clear without any purulence. Patient's now smoking 1 pack/day but she was up to 4 packs/day for 5 years and she has smoked for total 40 years on average 2 packs/day. In 2019 she had cavitary pneumonia of the right lung and was treated at [facility]. Patient does not do drugs or have animals or birds at home. 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   She is not on home oxygen          LUNG CANCER SCREENING     CRITERIA MET    []     CT ORDERED  []      CRITERIA NOT MET   [x]      REFUSED                    []        REASON CRITERIA NOT MET     SMOKING LESS THAN 30 PY  []      AGE LESS THAN 55 or GREATER 77 YEARS  []      QUIT SMOKING 15 YEARS OR GREATER   []      RECENT CT WITH IN 11 MONTHS    []      LIFE EXPECTANCY < 5 YEARS   []      SIGNS  AND SYMPTOMS OF LUNG CANCER   []         Immunization   Immunization History   Administered Date(s) Administered    Influenza Virus Vaccine 02/12/2020, 02/12/2020, 11/10/2020    PPD Test 06/08/2019    Pneumococcal, PPSV23, PNEUMOVAX 23, (age 2y+), SC/IM, 0.5mL 06/13/2019        Pneumococcal Vaccine     [] Up to date    [] Indicated   [] Refused  [] Contraindicated       Influenza Vaccine   [] Up to date    []

## 2023-08-07 DIAGNOSIS — J44.9 STAGE 4 VERY SEVERE COPD BY GOLD CLASSIFICATION (HCC): ICD-10-CM

## 2023-08-07 RX ORDER — AZITHROMYCIN 250 MG/1
TABLET, FILM COATED ORAL
Qty: 6 TABLET | Refills: 0 | Status: SHIPPED | OUTPATIENT
Start: 2023-08-07

## 2023-08-07 RX ORDER — PREDNISONE 10 MG/1
20 TABLET ORAL DAILY
Qty: 10 TABLET | Refills: 0 | Status: SHIPPED | OUTPATIENT
Start: 2023-08-07 | End: 2023-08-12

## 2023-08-21 RX ORDER — GABAPENTIN 800 MG/1
TABLET ORAL
Qty: 90 TABLET | Refills: 3 | Status: SHIPPED | OUTPATIENT
Start: 2023-08-21 | End: 2023-09-20

## 2023-08-21 RX ORDER — CYCLOBENZAPRINE HCL 10 MG
TABLET ORAL
Qty: 60 TABLET | Refills: 5 | Status: SHIPPED | OUTPATIENT
Start: 2023-08-21

## 2023-08-21 NOTE — TELEPHONE ENCOUNTER
Gabapentin 800 mg  Flexeril 10 mg  Last Appt: 11/7/22  Next Appt:   Visit date not found  Med verified in [address] [place] [address]

## 2023-10-04 DIAGNOSIS — J44.9 STAGE 4 VERY SEVERE COPD BY GOLD CLASSIFICATION (HCC): ICD-10-CM

## 2023-10-04 DIAGNOSIS — M47.817 LUMBOSACRAL SPONDYLOSIS WITHOUT MYELOPATHY: ICD-10-CM

## 2023-10-04 DIAGNOSIS — M51.36 DDD (DEGENERATIVE DISC DISEASE), LUMBAR: ICD-10-CM

## 2023-10-04 DIAGNOSIS — M54.16 LUMBAR RADICULOPATHY: ICD-10-CM

## 2023-10-04 DIAGNOSIS — G62.9 PERIPHERAL POLYNEUROPATHY: ICD-10-CM

## 2023-10-04 RX ORDER — GUAIFENESIN 600 MG/1
TABLET, EXTENDED RELEASE ORAL
Qty: 30 TABLET | Refills: 4 | Status: SHIPPED | OUTPATIENT
Start: 2023-10-04

## 2023-10-04 NOTE — TELEPHONE ENCOUNTER
Last Appt:  11/7/2022  Next Appt:   Visit date not found  Med verified in [address] [place] [address]

## 2023-10-05 RX ORDER — ETODOLAC 400 MG/1
TABLET, FILM COATED ORAL
Qty: 60 TABLET | Refills: 3 | Status: SHIPPED | OUTPATIENT
Start: 2023-10-05

## 2023-10-09 ENCOUNTER — TELEPHONE (OUTPATIENT)
Dept: ONCOLOGY | Age: 53
End: 2023-10-09

## 2023-11-22 ENCOUNTER — OFFICE VISIT (OUTPATIENT)
Dept: PULMONOLOGY | Age: 53
End: 2023-11-22
Payer: COMMERCIAL

## 2023-11-22 VITALS
HEIGHT: 65 IN | DIASTOLIC BLOOD PRESSURE: 80 MMHG | HEART RATE: 44 BPM | OXYGEN SATURATION: 100 % | BODY MASS INDEX: 36.65 KG/M2 | SYSTOLIC BLOOD PRESSURE: 138 MMHG | WEIGHT: 220 LBS

## 2023-11-22 DIAGNOSIS — J44.9 STAGE 4 VERY SEVERE COPD BY GOLD CLASSIFICATION (HCC): ICD-10-CM

## 2023-11-22 DIAGNOSIS — J96.11 CHRONIC RESPIRATORY FAILURE WITH HYPOXIA (HCC): ICD-10-CM

## 2023-11-22 DIAGNOSIS — J44.1 COPD EXACERBATION (HCC): Primary | ICD-10-CM

## 2023-11-22 DIAGNOSIS — J43.2 CENTRILOBULAR EMPHYSEMA (HCC): ICD-10-CM

## 2023-11-22 DIAGNOSIS — F17.200 CURRENT SMOKER: ICD-10-CM

## 2023-11-22 DIAGNOSIS — Z87.891 PERSONAL HISTORY OF TOBACCO USE: ICD-10-CM

## 2023-11-22 PROCEDURE — 3078F DIAST BP <80 MM HG: CPT | Performed by: INTERNAL MEDICINE

## 2023-11-22 PROCEDURE — G8427 DOCREV CUR MEDS BY ELIG CLIN: HCPCS | Performed by: INTERNAL MEDICINE

## 2023-11-22 PROCEDURE — 3023F SPIROM DOC REV: CPT | Performed by: INTERNAL MEDICINE

## 2023-11-22 PROCEDURE — 3074F SYST BP LT 130 MM HG: CPT | Performed by: INTERNAL MEDICINE

## 2023-11-22 PROCEDURE — 4004F PT TOBACCO SCREEN RCVD TLK: CPT | Performed by: INTERNAL MEDICINE

## 2023-11-22 PROCEDURE — G0296 VISIT TO DETERM LDCT ELIG: HCPCS | Performed by: INTERNAL MEDICINE

## 2023-11-22 PROCEDURE — 99213 OFFICE O/P EST LOW 20 MIN: CPT | Performed by: INTERNAL MEDICINE

## 2023-11-22 PROCEDURE — 99214 OFFICE O/P EST MOD 30 MIN: CPT | Performed by: INTERNAL MEDICINE

## 2023-11-22 PROCEDURE — G8484 FLU IMMUNIZE NO ADMIN: HCPCS | Performed by: INTERNAL MEDICINE

## 2023-11-22 PROCEDURE — G8417 CALC BMI ABV UP PARAM F/U: HCPCS | Performed by: INTERNAL MEDICINE

## 2023-11-22 PROCEDURE — 3017F COLORECTAL CA SCREEN DOC REV: CPT | Performed by: INTERNAL MEDICINE

## 2023-11-22 RX ORDER — PREDNISONE 10 MG/1
40 TABLET ORAL DAILY
Qty: 20 TABLET | Refills: 1 | Status: SHIPPED | OUTPATIENT
Start: 2023-11-22 | End: 2023-11-27

## 2023-11-22 RX ORDER — AZITHROMYCIN 250 MG/1
TABLET, FILM COATED ORAL
Qty: 6 TABLET | Refills: 1 | Status: SHIPPED | OUTPATIENT
Start: 2023-11-22

## 2023-11-22 NOTE — PROGRESS NOTES
REASON FOR THE CONSULTATION:  COPD  HISTORY OF PRESENT ILLNESS:    Claire Pacheco is a 46y.o. year old female   Still smoking - using oxygen , complaint with bronchodilator   She is wheezing , yellow sputum   No hemoptysis           Previous visit  Patient is doing well shortness of breath is stable with exertion   she did not have any hospital visits for COPD exacerbation or need steroids. Sputum is nonpurulent no hemoptysis   she is compliant with her inhalers. Last visit    here for evaluation of shortness of breath. Patient has been short of breath for few years but it has slowly been progressing. Patient gets short of breath walking short distances and at times she also wheezes. She has a lot of sputum throughout the day but no hemoptysis sputum is clear without any purulence. Patient's now smoking 1 pack/day but she was up to 4 packs/day for 5 years and she has smoked for total 40 years on average 2 packs/day. In 2019 she had cavitary pneumonia of the right lung and was treated at [facility]. Patient does not do drugs or have animals or birds at home. 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   She is not on home oxygen          LUNG CANCER SCREENING     CRITERIA MET    []     CT ORDERED  []      CRITERIA NOT MET   [x]      REFUSED                    []        REASON CRITERIA NOT MET     SMOKING LESS THAN 30 PY  []      AGE LESS THAN 55 or GREATER 77 YEARS  []      QUIT SMOKING 15 YEARS OR GREATER   []      RECENT CT WITH IN 11 MONTHS    []      LIFE EXPECTANCY < 5 YEARS   []      SIGNS  AND SYMPTOMS OF LUNG CANCER   []         Immunization   Immunization History   Administered Date(s) Administered    COVID-19, PFIZER PURPLE top, DILUTE for use, (age 15 y+), 30mcg/0.3mL 05/15/2021, 06/05/2021    Influenza Virus Vaccine 02/12/2020, 02/12/2020, 11/10/2020    PPD Test 06/08/2019    Pneumococcal, PPSV23, PNEUMOVAX 23, (age 2y+), SC/IM, 0.5mL 06/13/2019        Pneumococcal Vaccine Purse String (Intermediate) Text: Given the location of the defect and the characteristics of the surrounding skin a purse string intermediate closure was deemed most appropriate.  Undermining was performed circumferentially around the surgical defect.  A purse string suture was then placed and tightened.

## 2024-01-31 DIAGNOSIS — J44.1 COPD EXACERBATION (HCC): ICD-10-CM

## 2024-01-31 RX ORDER — AZITHROMYCIN 250 MG/1
TABLET, FILM COATED ORAL
Qty: 6 TABLET | Refills: 1 | Status: SHIPPED | OUTPATIENT
Start: 2024-01-31

## 2024-01-31 RX ORDER — PREDNISONE 10 MG/1
40 TABLET ORAL DAILY
Qty: 20 TABLET | Refills: 0 | Status: SHIPPED | OUTPATIENT
Start: 2024-01-31 | End: 2024-02-05

## 2024-01-31 NOTE — TELEPHONE ENCOUNTER
Pt called in stating that would need antibiotic and steroid sent to [facility]'s pharmacy in [place], [place]. Pt having congestion, yellowish green phlegm    Last ov: 11/2023  Next ov: 05/2024

## 2024-02-07 RX ORDER — CYCLOBENZAPRINE HCL 10 MG
TABLET ORAL
Qty: 60 TABLET | Refills: 5 | OUTPATIENT
Start: 2024-02-07

## 2024-02-07 NOTE — TELEPHONE ENCOUNTER
FLEXERIL 10 MG   Last Appt:  Visit date not found  Next Appt:   Visit date not found  Med verified in Epic

## 2024-03-12 DIAGNOSIS — J44.9 STAGE 4 VERY SEVERE COPD BY GOLD CLASSIFICATION (HCC): ICD-10-CM

## 2024-03-12 RX ORDER — GUAIFENESIN 600 MG/1
TABLET, EXTENDED RELEASE ORAL
Qty: 30 TABLET | Refills: 4 | Status: SHIPPED | OUTPATIENT
Start: 2024-03-12

## 2024-04-10 DIAGNOSIS — J44.9 STAGE 3 SEVERE COPD BY GOLD CLASSIFICATION (HCC): ICD-10-CM

## 2024-04-10 DIAGNOSIS — J43.2 CENTRILOBULAR EMPHYSEMA (HCC): ICD-10-CM

## 2024-04-10 RX ORDER — CYCLOBENZAPRINE HCL 10 MG
TABLET ORAL
Qty: 60 TABLET | Refills: 5 | OUTPATIENT
Start: 2024-04-10

## 2024-04-10 RX ORDER — GABAPENTIN 800 MG/1
TABLET ORAL
Qty: 90 TABLET | Refills: 3 | OUTPATIENT
Start: 2024-04-10 | End: 2025-02-12

## 2024-04-10 NOTE — TELEPHONE ENCOUNTER
Gabapentin 800 mg  Flexeril 10 mg  Last Appt:  11/7/2022  Next Appt:   Visit date not found  Med verified in Epic

## 2024-04-11 DIAGNOSIS — J44.1 COPD EXACERBATION (HCC): ICD-10-CM

## 2024-04-11 RX ORDER — ALBUTEROL SULFATE 90 UG/1
AEROSOL, METERED RESPIRATORY (INHALATION)
Qty: 54 G | Refills: 3 | Status: SHIPPED | OUTPATIENT
Start: 2024-04-11

## 2024-04-11 RX ORDER — AZITHROMYCIN 250 MG/1
TABLET, FILM COATED ORAL
Qty: 6 TABLET | Refills: 1 | Status: SHIPPED | OUTPATIENT
Start: 2024-04-11

## 2024-04-11 RX ORDER — PREDNISONE 10 MG/1
TABLET ORAL
Qty: 30 TABLET | Refills: 0 | Status: SHIPPED | OUTPATIENT
Start: 2024-04-11

## 2024-04-11 RX ORDER — BUDESONIDE AND FORMOTEROL FUMARATE DIHYDRATE 160; 4.5 UG/1; UG/1
AEROSOL RESPIRATORY (INHALATION)
Qty: 30.6 G | Refills: 3 | Status: SHIPPED | OUTPATIENT
Start: 2024-04-11

## 2024-04-11 NOTE — TELEPHONE ENCOUNTER
Last ov 11/22/23  Next ov 05/30/2024    Pt called in stating that is hard to breath, lungs feel full. Coughing up greenish/yellow phlegm. Pt would like antibiotic and steroid sent into [facility]. Please advise.

## 2024-05-15 RX ORDER — CYCLOBENZAPRINE HCL 10 MG
TABLET ORAL
Qty: 60 TABLET | Refills: 5 | OUTPATIENT
Start: 2024-05-15

## 2024-05-30 ENCOUNTER — OFFICE VISIT (OUTPATIENT)
Dept: PULMONOLOGY | Age: 54
End: 2024-05-30
Payer: MEDICARE

## 2024-05-30 VITALS
OXYGEN SATURATION: 93 % | RESPIRATION RATE: 12 BRPM | SYSTOLIC BLOOD PRESSURE: 120 MMHG | TEMPERATURE: 97.1 F | DIASTOLIC BLOOD PRESSURE: 74 MMHG | HEART RATE: 92 BPM | WEIGHT: 216 LBS | HEIGHT: 64 IN | BODY MASS INDEX: 36.88 KG/M2

## 2024-05-30 DIAGNOSIS — J43.2 CENTRILOBULAR EMPHYSEMA (HCC): ICD-10-CM

## 2024-05-30 DIAGNOSIS — Z87.891 PERSONAL HISTORY OF TOBACCO USE: ICD-10-CM

## 2024-05-30 DIAGNOSIS — J44.9 STAGE 4 VERY SEVERE COPD BY GOLD CLASSIFICATION (HCC): Primary | ICD-10-CM

## 2024-05-30 DIAGNOSIS — J96.11 CHRONIC RESPIRATORY FAILURE WITH HYPOXIA (HCC): ICD-10-CM

## 2024-05-30 DIAGNOSIS — Z87.891 PERSONAL HISTORY OF TOBACCO USE, PRESENTING HAZARDS TO HEALTH: ICD-10-CM

## 2024-05-30 DIAGNOSIS — R91.8 MULTIPLE LUNG NODULES ON CT: ICD-10-CM

## 2024-05-30 DIAGNOSIS — F17.200 CURRENT SMOKER: ICD-10-CM

## 2024-05-30 DIAGNOSIS — J44.1 COPD EXACERBATION (HCC): ICD-10-CM

## 2024-05-30 PROCEDURE — 3074F SYST BP LT 130 MM HG: CPT | Performed by: INTERNAL MEDICINE

## 2024-05-30 PROCEDURE — 3078F DIAST BP <80 MM HG: CPT | Performed by: INTERNAL MEDICINE

## 2024-05-30 PROCEDURE — G8417 CALC BMI ABV UP PARAM F/U: HCPCS | Performed by: INTERNAL MEDICINE

## 2024-05-30 PROCEDURE — 4004F PT TOBACCO SCREEN RCVD TLK: CPT | Performed by: INTERNAL MEDICINE

## 2024-05-30 PROCEDURE — 3017F COLORECTAL CA SCREEN DOC REV: CPT | Performed by: INTERNAL MEDICINE

## 2024-05-30 PROCEDURE — 3023F SPIROM DOC REV: CPT | Performed by: INTERNAL MEDICINE

## 2024-05-30 PROCEDURE — G8427 DOCREV CUR MEDS BY ELIG CLIN: HCPCS | Performed by: INTERNAL MEDICINE

## 2024-05-30 PROCEDURE — G0296 VISIT TO DETERM LDCT ELIG: HCPCS | Performed by: INTERNAL MEDICINE

## 2024-05-30 PROCEDURE — 99214 OFFICE O/P EST MOD 30 MIN: CPT | Performed by: INTERNAL MEDICINE

## 2024-05-30 PROCEDURE — 99406 BEHAV CHNG SMOKING 3-10 MIN: CPT | Performed by: INTERNAL MEDICINE

## 2024-05-30 RX ORDER — PREDNISONE 10 MG/1
TABLET ORAL
Qty: 30 TABLET | Refills: 0 | Status: SHIPPED | OUTPATIENT
Start: 2024-05-30

## 2024-05-30 RX ORDER — FLUTICASONE FUROATE, UMECLIDINIUM BROMIDE AND VILANTEROL TRIFENATATE 100; 62.5; 25 UG/1; UG/1; UG/1
1 POWDER RESPIRATORY (INHALATION) DAILY
Qty: 1 EACH | Refills: 5 | Status: SHIPPED | OUTPATIENT
Start: 2024-05-30

## 2024-05-30 NOTE — PATIENT INSTRUCTIONS
have questions about a medical condition or this instruction, always ask your healthcare professional. [employer], [facility] disclaims any warranty or liability for your use of this information.           Deciding About Using Medicines To Quit Smoking  How can you decide about using medicines to quit smoking?  What are the medicines you can use?  Your doctor may prescribe varenicline (Chantix) or bupropion SR. These medicines can help you cope with cravings for tobacco. They are pills that don't contain nicotine.  You also can use nicotine replacement products. These do contain nicotine. There are many types.  Gum and lozenges slowly release nicotine into your mouth.  Patches stick to your skin. They slowly release nicotine into your bloodstream.  An inhaler has a vicente that contains nicotine. You breathe in a puff of nicotine vapor through your mouth and throat.  Nasal spray releases a mist that contains nicotine.  What are key points about this decision?  Using medicines can increase your chances of quitting smoking. They can ease cravings and withdrawal symptoms.  Getting counseling along with using medicine can raise your chances of quitting even more.  These nicotine replacement products have less nicotine than cigarettes. And by itself, nicotine is not nearly as harmful as smoking. The tars, carbon monoxide, and other toxic chemicals in tobacco cause the harmful effects.  The side effects of nicotine replacement products depend on the type of product. For example, a patch can make your skin red and itchy. Medicines in pill form can make you sick to your stomach. They can also cause dry mouth and trouble sleeping. For most people, the side effects are not bad enough to make them stop using the products.  Why might you choose to use medicines to quit smoking?  You have tried on your own to stop smoking, but you were not able to stop.  You want to increase your chances of quitting smoking.  You want to reduce

## 2024-05-30 NOTE — PROGRESS NOTES
Writer scheduled scan   6 min walk completed     Patient ambulated approximately 700ft. Patient Sat was 92% on room air at rest. During walk Sat dropped to 86%. Platient was placed on 3L during test and Sat increased to 93%.   
clinical thinking. Thank you for allowing me to participate in his care.     Sincerely,      Electronically signed by CRISS COLEMAN MD on 6/3/2024 at 10:22 PM       Please note that this chart was generated using voice recognition Dragon dictation software.  Although every effort was made to ensure the accuracy of this automated transcription, some errors in transcription may have occurred.        The patient  reports that she has been smoking cigarettes. She started smoking about 41 years ago. She has a 41.4 pack-year smoking history. She has never used smokeless tobacco.. Discussed with patient the risks and benefits of screening, including over-diagnosis, false positive rate, and total radiation exposure.  The patient currently exhibits no signs or symptoms suggestive of lung cancer.  Discussed with patient the importance of compliance with yearly annual lung cancer screenings and willingness to undergo diagnosis and treatment if screening scan is positive.  In addition, the patient was counseled regarding the importance of remaining smoke free and/or total smoking cessation.    Also reviewed the following if the patient has Medicare that as of February 10, 2022, Medicare only covers LDCT screening in patients aged 50-77 with at least a 20 pack-year smoking history who currently smoke or have quit in the last 15 yearsTobacco Cessation Counseling: Patient advised about behavior change, including information about personal health harms, usage of appropriate cessation measures and benefits of cessation.  Time spent (minutes): 3    Discussed with the patient the current USPSTF guidelines released March 9, 2021 for screening for lung cancer.    For adults aged 50 to 80 years who have a 20 pack-year smoking history and currently smoke or have quit within the past 15 years the grade B recommendation is to:  Screen for lung cancer with low-dose computed tomography (LDCT) every year.  Stop screening once a person has not

## 2024-06-05 ENCOUNTER — OFFICE VISIT (OUTPATIENT)
Dept: PAIN MANAGEMENT | Age: 54
End: 2024-06-05
Payer: MEDICARE

## 2024-06-05 VITALS
BODY MASS INDEX: 36.65 KG/M2 | SYSTOLIC BLOOD PRESSURE: 144 MMHG | DIASTOLIC BLOOD PRESSURE: 67 MMHG | HEIGHT: 65 IN | RESPIRATION RATE: 18 BRPM | OXYGEN SATURATION: 94 % | HEART RATE: 113 BPM | WEIGHT: 220 LBS

## 2024-06-05 DIAGNOSIS — M54.16 LUMBAR RADICULOPATHY: ICD-10-CM

## 2024-06-05 DIAGNOSIS — M51.36 DDD (DEGENERATIVE DISC DISEASE), LUMBAR: ICD-10-CM

## 2024-06-05 DIAGNOSIS — M47.817 LUMBOSACRAL SPONDYLOSIS WITHOUT MYELOPATHY: ICD-10-CM

## 2024-06-05 DIAGNOSIS — G62.9 PERIPHERAL POLYNEUROPATHY: ICD-10-CM

## 2024-06-05 DIAGNOSIS — R29.898 WEAKNESS OF BOTH LEGS: Primary | ICD-10-CM

## 2024-06-05 PROCEDURE — 3077F SYST BP >= 140 MM HG: CPT | Performed by: NURSE PRACTITIONER

## 2024-06-05 PROCEDURE — 99214 OFFICE O/P EST MOD 30 MIN: CPT | Performed by: NURSE PRACTITIONER

## 2024-06-05 PROCEDURE — G8427 DOCREV CUR MEDS BY ELIG CLIN: HCPCS | Performed by: NURSE PRACTITIONER

## 2024-06-05 PROCEDURE — 3078F DIAST BP <80 MM HG: CPT | Performed by: NURSE PRACTITIONER

## 2024-06-05 PROCEDURE — 3017F COLORECTAL CA SCREEN DOC REV: CPT | Performed by: NURSE PRACTITIONER

## 2024-06-05 PROCEDURE — 4004F PT TOBACCO SCREEN RCVD TLK: CPT | Performed by: NURSE PRACTITIONER

## 2024-06-05 PROCEDURE — G8417 CALC BMI ABV UP PARAM F/U: HCPCS | Performed by: NURSE PRACTITIONER

## 2024-06-05 RX ORDER — BACLOFEN 10 MG/1
10 TABLET ORAL 3 TIMES DAILY PRN
Qty: 15 TABLET | Refills: 0 | Status: SHIPPED | OUTPATIENT
Start: 2024-06-05 | End: 2024-06-10

## 2024-06-05 RX ORDER — GABAPENTIN 800 MG/1
TABLET ORAL
Qty: 90 TABLET | Refills: 11 | Status: SHIPPED | OUTPATIENT
Start: 2024-06-05 | End: 2024-07-05

## 2024-06-05 RX ORDER — CYCLOBENZAPRINE HCL 10 MG
10 TABLET ORAL 3 TIMES DAILY PRN
Qty: 90 TABLET | Refills: 11 | Status: SHIPPED | OUTPATIENT
Start: 2024-06-05 | End: 2025-05-31

## 2024-06-05 RX ORDER — METHYLPREDNISOLONE 4 MG/1
TABLET ORAL
COMMUNITY
Start: 2024-05-20

## 2024-06-05 RX ORDER — IPRATROPIUM BROMIDE AND ALBUTEROL SULFATE 2.5; .5 MG/3ML; MG/3ML
3 SOLUTION RESPIRATORY (INHALATION) EVERY 6 HOURS PRN
COMMUNITY
Start: 2024-05-21 | End: 2024-06-20

## 2024-06-05 RX ORDER — ROFLUMILAST 250 UG/1
250 TABLET ORAL DAILY
COMMUNITY
Start: 2024-05-18 | End: 2024-06-17

## 2024-06-05 RX ORDER — ETODOLAC 400 MG/1
400 TABLET, FILM COATED ORAL 2 TIMES DAILY
Qty: 60 TABLET | Refills: 11 | Status: SHIPPED | OUTPATIENT
Start: 2024-06-05

## 2024-06-05 RX ORDER — CHOLECALCIFEROL (VITAMIN D3) 1250 MCG
CAPSULE ORAL
COMMUNITY
Start: 2024-05-18

## 2024-06-05 RX ORDER — NITROGLYCERIN 0.4 MG/1
TABLET SUBLINGUAL
COMMUNITY
Start: 2023-10-03 | End: 2024-10-08

## 2024-06-05 ASSESSMENT — ENCOUNTER SYMPTOMS
CONSTIPATION: 0
SORE THROAT: 0
BACK PAIN: 1
SHORTNESS OF BREATH: 1

## 2024-06-05 NOTE — PROGRESS NOTES
5. DDD (degenerative disc disease), lumbar     controlled on current medication regime, wll continue current pain medications to improve quality of life and function.     Follow up 12 months

## 2024-06-18 ENCOUNTER — TELEPHONE (OUTPATIENT)
Dept: PAIN MANAGEMENT | Age: 54
End: 2024-06-18

## 2024-06-18 NOTE — TELEPHONE ENCOUNTER
Saida with Dr Yelitza Durant office called this morning to inform this office that they will be prescribing a short term pain prescription for this patient. They plan to prescribe either tylenol#3 or Tramadol to manage tooth pain for the next few days.

## 2024-06-19 ENCOUNTER — TELEPHONE (OUTPATIENT)
Dept: PAIN MANAGEMENT | Age: 54
End: 2024-06-19

## 2024-06-19 NOTE — TELEPHONE ENCOUNTER
Patient phoned into office. Currently experiencing a tooth abscess. Saw Primary care yesterday and they prescribed Tramadol 50 mg q 8hrs, but this is not helping the pain. Patient is wondering if we can send something else to [facility] in [place].

## 2024-06-20 NOTE — TELEPHONE ENCOUNTER
I am not prescribing anything for a tooth abscess. She needs to call her PCP since that is who she saw for it and prescribed tramadol, let them know it's not working. I manage her chronic pain only and see her on a yearly basis, not managing acute pain when I am not the one to evaluate the abscess

## 2024-06-20 NOTE — TELEPHONE ENCOUNTER
Last Appt:  6/5/2024  Next Appt:   Visit date not found  Med verified in Epic    Was just seen beginning of month. Do you want to send something short term in?

## 2024-06-21 NOTE — TELEPHONE ENCOUNTER
Patient phoned into office again this afternoon. Relayed the message to pt that she would need to go to ER or her pcp for this acute issue.

## 2024-07-16 ENCOUNTER — HOSPITAL ENCOUNTER (OUTPATIENT)
Dept: CT IMAGING | Age: 54
Discharge: HOME | End: 2024-07-18
Attending: INTERNAL MEDICINE
Payer: MEDICARE

## 2024-07-16 DIAGNOSIS — Z87.891 PERSONAL HISTORY OF TOBACCO USE: ICD-10-CM

## 2024-07-16 PROCEDURE — 71271 CT THORAX LUNG CANCER SCR C-: CPT

## 2024-09-06 DIAGNOSIS — J44.9 STAGE 4 VERY SEVERE COPD BY GOLD CLASSIFICATION (HCC): ICD-10-CM

## 2024-09-10 RX ORDER — GUAIFENESIN 600 MG/1
TABLET, EXTENDED RELEASE ORAL
Qty: 40 TABLET | Refills: 3 | Status: SHIPPED | OUTPATIENT
Start: 2024-09-10

## 2024-09-11 DIAGNOSIS — J44.1 COPD EXACERBATION (HCC): ICD-10-CM

## 2024-09-11 RX ORDER — AMOXICILLIN 500 MG/1
CAPSULE ORAL
COMMUNITY
Start: 2024-06-18 | End: 2024-09-11 | Stop reason: SDUPTHER

## 2024-09-11 RX ORDER — AMOXICILLIN 500 MG/1
500 CAPSULE ORAL 2 TIMES DAILY
Qty: 14 CAPSULE | Refills: 0 | Status: SHIPPED | OUTPATIENT
Start: 2024-09-11 | End: 2024-09-18

## 2024-09-11 RX ORDER — PREDNISONE 10 MG/1
TABLET ORAL
Qty: 30 TABLET | Refills: 0 | Status: SHIPPED | OUTPATIENT
Start: 2024-09-11

## 2024-10-28 DIAGNOSIS — J44.1 COPD EXACERBATION (HCC): ICD-10-CM

## 2024-10-28 RX ORDER — AMOXICILLIN 500 MG/1
500 CAPSULE ORAL 2 TIMES DAILY
Qty: 14 CAPSULE | Refills: 0 | Status: SHIPPED | OUTPATIENT
Start: 2024-10-28 | End: 2024-11-04

## 2024-10-28 RX ORDER — PREDNISONE 10 MG/1
30 TABLET ORAL DAILY
Qty: 15 TABLET | Refills: 0 | Status: SHIPPED | OUTPATIENT
Start: 2024-10-28 | End: 2024-11-02

## 2024-10-28 NOTE — TELEPHONE ENCOUNTER
Pt unable to do vv, pt would like refill of steroid and antibiotic to have on hand.     Next OV: 02/19/2025

## 2025-01-13 DIAGNOSIS — J44.9 STAGE 4 VERY SEVERE COPD BY GOLD CLASSIFICATION (HCC): ICD-10-CM

## 2025-01-14 RX ORDER — FLUTICASONE FUROATE, UMECLIDINIUM BROMIDE AND VILANTEROL TRIFENATATE 100; 62.5; 25 UG/1; UG/1; UG/1
1 POWDER RESPIRATORY (INHALATION) DAILY
Qty: 1 EACH | Refills: 5 | Status: SHIPPED | OUTPATIENT
Start: 2025-01-14

## 2025-01-14 RX ORDER — GUAIFENESIN 600 MG/1
TABLET, EXTENDED RELEASE ORAL
Qty: 40 TABLET | Refills: 3 | Status: SHIPPED | OUTPATIENT
Start: 2025-01-14

## 2025-02-04 NOTE — PROGRESS NOTES
Subjective:      Patient ID: Chio Mejia is a 54 y.o. female.     Patient must see physician at next appointment    HPI  Patient here for COPD history of cavitary pneumonia treated in for [facility] in 2019 follow-up. Last seen in office on May 2024 per       [] Dr. Blount  [x] Dr. Beltran  [] Dr. Minor  [] Dr. Thomas  [] Dr. Aggarwal  [] Dr. Morales  [] Dr. Courtney  [] GABE Angelo APRN- CNP        History of Present Illness  The patient presents for a follow-up of COPD.    She is currently not utilizing her albuterol nebulizer solution but anticipates resuming its use due to a recurrence of symptoms. She has been experiencing increased coughing with yellow sputum production, although it has not yet progressed to a dark green color. She reports an increase in the volume of her sputum . She has not required antibiotics recently and has managed her condition well without them. She has previously been treated with Z-Juvenal and steroid packs but has not required these treatments for some time. She is seeking refills for her pulmonary medications and inquires about the availability of Trelegy samples. She is on supplemental oxygen at 4 liters, which she typically uses at home. She has smaller oxygen tanks but finds them increasingly heavy and is considering a portable concentrator. She is uncertain about her eligibility for this device under Medicaid or Medicare. She continues to smoke, albeit at a reduced rate of half a pack per day. She has attempted to quit using NicoDerm patches but found them ineffective as she continued to crave cigarettes. She has also tried nicotine gum but found it unpalatable. She engages in meditation before sleep and was previously physically active and in good health. She expresses concern about the potential long-term effects of her smoking habit.      SOCIAL HISTORY  The patient admits to smoking about half a pack a day.  With at least a 40-pack-year smoking history.  Remains eligible for CT

## 2025-02-12 ENCOUNTER — OFFICE VISIT (OUTPATIENT)
Dept: PULMONOLOGY | Age: 55
End: 2025-02-12
Payer: MEDICARE

## 2025-02-12 VITALS
BODY MASS INDEX: 36.65 KG/M2 | SYSTOLIC BLOOD PRESSURE: 136 MMHG | HEART RATE: 84 BPM | WEIGHT: 220 LBS | OXYGEN SATURATION: 93 % | TEMPERATURE: 96 F | HEIGHT: 65 IN | DIASTOLIC BLOOD PRESSURE: 78 MMHG

## 2025-02-12 DIAGNOSIS — J96.11 CHRONIC RESPIRATORY FAILURE WITH HYPOXIA: ICD-10-CM

## 2025-02-12 DIAGNOSIS — Z87.891 PERSONAL HISTORY OF TOBACCO USE: Primary | ICD-10-CM

## 2025-02-12 DIAGNOSIS — J43.2 CENTRILOBULAR EMPHYSEMA (HCC): ICD-10-CM

## 2025-02-12 DIAGNOSIS — J44.9 STAGE 4 VERY SEVERE COPD BY GOLD CLASSIFICATION (HCC): ICD-10-CM

## 2025-02-12 PROCEDURE — 99214 OFFICE O/P EST MOD 30 MIN: CPT | Performed by: NURSE PRACTITIONER

## 2025-02-12 PROCEDURE — 3017F COLORECTAL CA SCREEN DOC REV: CPT | Performed by: NURSE PRACTITIONER

## 2025-02-12 PROCEDURE — 3078F DIAST BP <80 MM HG: CPT | Performed by: NURSE PRACTITIONER

## 2025-02-12 PROCEDURE — G8417 CALC BMI ABV UP PARAM F/U: HCPCS | Performed by: NURSE PRACTITIONER

## 2025-02-12 PROCEDURE — 3075F SYST BP GE 130 - 139MM HG: CPT | Performed by: NURSE PRACTITIONER

## 2025-02-12 PROCEDURE — G0296 VISIT TO DETERM LDCT ELIG: HCPCS | Performed by: NURSE PRACTITIONER

## 2025-02-12 PROCEDURE — 4004F PT TOBACCO SCREEN RCVD TLK: CPT | Performed by: NURSE PRACTITIONER

## 2025-02-12 PROCEDURE — G8427 DOCREV CUR MEDS BY ELIG CLIN: HCPCS | Performed by: NURSE PRACTITIONER

## 2025-02-12 PROCEDURE — 3023F SPIROM DOC REV: CPT | Performed by: NURSE PRACTITIONER

## 2025-02-12 RX ORDER — ALBUTEROL SULFATE 90 UG/1
INHALANT RESPIRATORY (INHALATION)
Qty: 3 EACH | Refills: 3 | Status: SHIPPED | OUTPATIENT
Start: 2025-02-12

## 2025-02-12 RX ORDER — PREDNISONE 20 MG/1
20 TABLET ORAL 2 TIMES DAILY
Qty: 10 TABLET | Refills: 0 | Status: SHIPPED | OUTPATIENT
Start: 2025-02-12 | End: 2025-02-17

## 2025-02-12 RX ORDER — FLUTICASONE FUROATE, UMECLIDINIUM BROMIDE AND VILANTEROL TRIFENATATE 100; 62.5; 25 UG/1; UG/1; UG/1
1 POWDER RESPIRATORY (INHALATION) DAILY
Qty: 3 EACH | Refills: 3 | Status: SHIPPED | OUTPATIENT
Start: 2025-02-12

## 2025-02-12 RX ORDER — IPRATROPIUM BROMIDE AND ALBUTEROL SULFATE 2.5; .5 MG/3ML; MG/3ML
3 SOLUTION RESPIRATORY (INHALATION) EVERY 6 HOURS PRN
Qty: 360 ML | Refills: 3 | Status: SHIPPED | OUTPATIENT
Start: 2025-02-12 | End: 2025-11-06

## 2025-02-12 RX ORDER — ROFLUMILAST 250 UG/1
250 TABLET ORAL DAILY
Qty: 90 TABLET | Refills: 3 | Status: SHIPPED | OUTPATIENT
Start: 2025-02-12 | End: 2026-02-07

## 2025-02-12 ASSESSMENT — ENCOUNTER SYMPTOMS
EYES NEGATIVE: 1
GASTROINTESTINAL NEGATIVE: 1
ALLERGIC/IMMUNOLOGIC NEGATIVE: 1

## 2025-02-18 DIAGNOSIS — J44.9 STAGE 4 VERY SEVERE COPD BY GOLD CLASSIFICATION (HCC): ICD-10-CM

## 2025-02-18 RX ORDER — IPRATROPIUM BROMIDE AND ALBUTEROL SULFATE 2.5; .5 MG/3ML; MG/3ML
3 SOLUTION RESPIRATORY (INHALATION) EVERY 6 HOURS PRN
Qty: 360 ML | Refills: 3 | Status: SHIPPED | OUTPATIENT
Start: 2025-02-18 | End: 2025-11-12

## 2025-02-18 NOTE — TELEPHONE ENCOUNTER
Medication needs to go to [facility] pharmacy.    Last Appt:  2/12/2025  Next Appt:  8/20/2025  Med verified in Epic

## 2025-04-12 DIAGNOSIS — M54.16 LUMBAR RADICULOPATHY: ICD-10-CM

## 2025-04-12 DIAGNOSIS — M51.369 DDD (DEGENERATIVE DISC DISEASE), LUMBAR: ICD-10-CM

## 2025-04-12 DIAGNOSIS — M47.817 LUMBOSACRAL SPONDYLOSIS WITHOUT MYELOPATHY: ICD-10-CM

## 2025-04-12 DIAGNOSIS — G62.9 PERIPHERAL POLYNEUROPATHY: ICD-10-CM

## 2025-04-14 RX ORDER — ETODOLAC 400 MG/1
TABLET, FILM COATED ORAL
Qty: 60 TABLET | Refills: 11 | Status: SHIPPED | OUTPATIENT
Start: 2025-04-14

## 2025-04-25 DIAGNOSIS — J44.9 STAGE 4 VERY SEVERE COPD BY GOLD CLASSIFICATION (HCC): Primary | ICD-10-CM

## 2025-06-30 DIAGNOSIS — J44.9 STAGE 4 VERY SEVERE COPD BY GOLD CLASSIFICATION (HCC): ICD-10-CM

## 2025-06-30 RX ORDER — GUAIFENESIN 600 MG/1
TABLET, EXTENDED RELEASE ORAL
Qty: 40 TABLET | Refills: 3 | Status: SHIPPED | OUTPATIENT
Start: 2025-06-30

## 2025-07-17 ENCOUNTER — HOSPITAL ENCOUNTER (OUTPATIENT)
Dept: CT IMAGING | Age: 55
Discharge: HOME | End: 2025-07-19
Payer: MEDICARE

## 2025-07-17 DIAGNOSIS — Z87.891 PERSONAL HISTORY OF TOBACCO USE: ICD-10-CM

## 2025-07-17 PROCEDURE — 71271 CT THORAX LUNG CANCER SCR C-: CPT

## 2025-08-11 RX ORDER — CYCLOBENZAPRINE HCL 10 MG
TABLET ORAL
Qty: 90 TABLET | Refills: 11 | OUTPATIENT
Start: 2025-08-11

## 2025-08-11 RX ORDER — GABAPENTIN 800 MG/1
TABLET ORAL
Qty: 90 TABLET | Refills: 5 | OUTPATIENT
Start: 2025-08-11 | End: 2026-01-07

## 2025-08-20 ENCOUNTER — OFFICE VISIT (OUTPATIENT)
Dept: PULMONOLOGY | Age: 55
End: 2025-08-20
Payer: MEDICARE

## 2025-08-20 VITALS
TEMPERATURE: 96.8 F | WEIGHT: 214 LBS | OXYGEN SATURATION: 96 % | HEART RATE: 51 BPM | SYSTOLIC BLOOD PRESSURE: 128 MMHG | DIASTOLIC BLOOD PRESSURE: 72 MMHG | BODY MASS INDEX: 35.61 KG/M2

## 2025-08-20 DIAGNOSIS — Z87.891 PERSONAL HISTORY OF TOBACCO USE: ICD-10-CM

## 2025-08-20 DIAGNOSIS — Z87.891 PERSONAL HISTORY OF TOBACCO USE, PRESENTING HAZARDS TO HEALTH: ICD-10-CM

## 2025-08-20 DIAGNOSIS — J44.9 STAGE 4 VERY SEVERE COPD BY GOLD CLASSIFICATION (HCC): Primary | ICD-10-CM

## 2025-08-20 DIAGNOSIS — F17.200 CURRENT SMOKER: ICD-10-CM

## 2025-08-20 DIAGNOSIS — J43.2 CENTRILOBULAR EMPHYSEMA (HCC): ICD-10-CM

## 2025-08-20 DIAGNOSIS — J96.11 CHRONIC RESPIRATORY FAILURE WITH HYPOXIA (HCC): ICD-10-CM

## 2025-08-20 PROCEDURE — G8417 CALC BMI ABV UP PARAM F/U: HCPCS | Performed by: INTERNAL MEDICINE

## 2025-08-20 PROCEDURE — 99406 BEHAV CHNG SMOKING 3-10 MIN: CPT | Performed by: INTERNAL MEDICINE

## 2025-08-20 PROCEDURE — 4004F PT TOBACCO SCREEN RCVD TLK: CPT | Performed by: INTERNAL MEDICINE

## 2025-08-20 PROCEDURE — 3074F SYST BP LT 130 MM HG: CPT | Performed by: INTERNAL MEDICINE

## 2025-08-20 PROCEDURE — 3017F COLORECTAL CA SCREEN DOC REV: CPT | Performed by: INTERNAL MEDICINE

## 2025-08-20 PROCEDURE — 99214 OFFICE O/P EST MOD 30 MIN: CPT | Performed by: INTERNAL MEDICINE

## 2025-08-20 PROCEDURE — 3023F SPIROM DOC REV: CPT | Performed by: INTERNAL MEDICINE

## 2025-08-20 PROCEDURE — 3078F DIAST BP <80 MM HG: CPT | Performed by: INTERNAL MEDICINE

## 2025-08-20 PROCEDURE — 99213 OFFICE O/P EST LOW 20 MIN: CPT | Performed by: INTERNAL MEDICINE

## 2025-08-20 PROCEDURE — G8427 DOCREV CUR MEDS BY ELIG CLIN: HCPCS | Performed by: INTERNAL MEDICINE

## 2025-08-20 RX ORDER — AZITHROMYCIN 250 MG/1
TABLET, FILM COATED ORAL
Qty: 6 TABLET | Refills: 0 | Status: SHIPPED | OUTPATIENT
Start: 2025-08-20

## 2025-08-20 RX ORDER — ROFLUMILAST 250 UG/1
250 TABLET ORAL DAILY
Qty: 90 TABLET | Refills: 3 | Status: SHIPPED | OUTPATIENT
Start: 2025-08-20 | End: 2026-08-15

## 2025-08-20 RX ORDER — PREDNISONE 10 MG/1
40 TABLET ORAL DAILY
Qty: 20 TABLET | Refills: 0 | Status: SHIPPED | OUTPATIENT
Start: 2025-08-20 | End: 2025-08-25